# Patient Record
Sex: MALE | Race: BLACK OR AFRICAN AMERICAN | NOT HISPANIC OR LATINO
[De-identification: names, ages, dates, MRNs, and addresses within clinical notes are randomized per-mention and may not be internally consistent; named-entity substitution may affect disease eponyms.]

---

## 2018-06-01 ENCOUNTER — TRANSCRIPTION ENCOUNTER (OUTPATIENT)
Age: 53
End: 2018-06-01

## 2020-04-11 ENCOUNTER — EMERGENCY (EMERGENCY)
Facility: HOSPITAL | Age: 55
LOS: 0 days | Discharge: HOME | End: 2020-04-12
Attending: EMERGENCY MEDICINE | Admitting: EMERGENCY MEDICINE
Payer: SUBSIDIZED

## 2020-04-11 VITALS
WEIGHT: 201.94 LBS | HEART RATE: 78 BPM | HEIGHT: 75 IN | RESPIRATION RATE: 18 BRPM | TEMPERATURE: 98 F | OXYGEN SATURATION: 98 % | DIASTOLIC BLOOD PRESSURE: 97 MMHG | SYSTOLIC BLOOD PRESSURE: 160 MMHG

## 2020-04-11 DIAGNOSIS — Z91.013 ALLERGY TO SEAFOOD: ICD-10-CM

## 2020-04-11 DIAGNOSIS — Z91.040 LATEX ALLERGY STATUS: ICD-10-CM

## 2020-04-11 DIAGNOSIS — R10.9 UNSPECIFIED ABDOMINAL PAIN: ICD-10-CM

## 2020-04-11 DIAGNOSIS — Z87.891 PERSONAL HISTORY OF NICOTINE DEPENDENCE: ICD-10-CM

## 2020-04-11 DIAGNOSIS — R10.13 EPIGASTRIC PAIN: ICD-10-CM

## 2020-04-11 DIAGNOSIS — R10.11 RIGHT UPPER QUADRANT PAIN: ICD-10-CM

## 2020-04-11 LAB
ALBUMIN SERPL ELPH-MCNC: 4.4 G/DL — SIGNIFICANT CHANGE UP (ref 3.5–5.2)
ALP SERPL-CCNC: 94 U/L — SIGNIFICANT CHANGE UP (ref 30–115)
ALT FLD-CCNC: 19 U/L — SIGNIFICANT CHANGE UP (ref 0–41)
ANION GAP SERPL CALC-SCNC: 12 MMOL/L — SIGNIFICANT CHANGE UP (ref 7–14)
APTT BLD: 27.9 SEC — SIGNIFICANT CHANGE UP (ref 27–39.2)
AST SERPL-CCNC: 23 U/L — SIGNIFICANT CHANGE UP (ref 0–41)
BASOPHILS # BLD AUTO: 0.01 K/UL — SIGNIFICANT CHANGE UP (ref 0–0.2)
BASOPHILS NFR BLD AUTO: 0.2 % — SIGNIFICANT CHANGE UP (ref 0–1)
BILIRUB DIRECT SERPL-MCNC: 0.2 MG/DL — SIGNIFICANT CHANGE UP (ref 0–0.2)
BILIRUB INDIRECT FLD-MCNC: 0.9 MG/DL — SIGNIFICANT CHANGE UP (ref 0.2–1.2)
BILIRUB SERPL-MCNC: 1.1 MG/DL — SIGNIFICANT CHANGE UP (ref 0.2–1.2)
BUN SERPL-MCNC: 11 MG/DL — SIGNIFICANT CHANGE UP (ref 10–20)
CALCIUM SERPL-MCNC: 9.3 MG/DL — SIGNIFICANT CHANGE UP (ref 8.5–10.1)
CHLORIDE SERPL-SCNC: 98 MMOL/L — SIGNIFICANT CHANGE UP (ref 98–110)
CO2 SERPL-SCNC: 27 MMOL/L — SIGNIFICANT CHANGE UP (ref 17–32)
CREAT SERPL-MCNC: 1.1 MG/DL — SIGNIFICANT CHANGE UP (ref 0.7–1.5)
EOSINOPHIL # BLD AUTO: 0.19 K/UL — SIGNIFICANT CHANGE UP (ref 0–0.7)
EOSINOPHIL NFR BLD AUTO: 4.3 % — SIGNIFICANT CHANGE UP (ref 0–8)
GLUCOSE SERPL-MCNC: 96 MG/DL — SIGNIFICANT CHANGE UP (ref 70–99)
HCT VFR BLD CALC: 41.6 % — LOW (ref 42–52)
HGB BLD-MCNC: 14.2 G/DL — SIGNIFICANT CHANGE UP (ref 14–18)
IMM GRANULOCYTES NFR BLD AUTO: 0.2 % — SIGNIFICANT CHANGE UP (ref 0.1–0.3)
INR BLD: 1.06 RATIO — SIGNIFICANT CHANGE UP (ref 0.65–1.3)
LACTATE SERPL-SCNC: 1 MMOL/L — SIGNIFICANT CHANGE UP (ref 0.7–2)
LIDOCAIN IGE QN: 27 U/L — SIGNIFICANT CHANGE UP (ref 7–60)
LYMPHOCYTES # BLD AUTO: 2 K/UL — SIGNIFICANT CHANGE UP (ref 1.2–3.4)
LYMPHOCYTES # BLD AUTO: 44.8 % — SIGNIFICANT CHANGE UP (ref 20.5–51.1)
MCHC RBC-ENTMCNC: 29.5 PG — SIGNIFICANT CHANGE UP (ref 27–31)
MCHC RBC-ENTMCNC: 34.1 G/DL — SIGNIFICANT CHANGE UP (ref 32–37)
MCV RBC AUTO: 86.5 FL — SIGNIFICANT CHANGE UP (ref 80–94)
MONOCYTES # BLD AUTO: 0.49 K/UL — SIGNIFICANT CHANGE UP (ref 0.1–0.6)
MONOCYTES NFR BLD AUTO: 11 % — HIGH (ref 1.7–9.3)
NEUTROPHILS # BLD AUTO: 1.76 K/UL — SIGNIFICANT CHANGE UP (ref 1.4–6.5)
NEUTROPHILS NFR BLD AUTO: 39.5 % — LOW (ref 42.2–75.2)
NRBC # BLD: 0 /100 WBCS — SIGNIFICANT CHANGE UP (ref 0–0)
PLATELET # BLD AUTO: 239 K/UL — SIGNIFICANT CHANGE UP (ref 130–400)
POTASSIUM SERPL-MCNC: 4.5 MMOL/L — SIGNIFICANT CHANGE UP (ref 3.5–5)
POTASSIUM SERPL-SCNC: 4.5 MMOL/L — SIGNIFICANT CHANGE UP (ref 3.5–5)
PROT SERPL-MCNC: 7.8 G/DL — SIGNIFICANT CHANGE UP (ref 6–8)
PROTHROM AB SERPL-ACNC: 12.2 SEC — SIGNIFICANT CHANGE UP (ref 9.95–12.87)
RBC # BLD: 4.81 M/UL — SIGNIFICANT CHANGE UP (ref 4.7–6.1)
RBC # FLD: 11.6 % — SIGNIFICANT CHANGE UP (ref 11.5–14.5)
SODIUM SERPL-SCNC: 137 MMOL/L — SIGNIFICANT CHANGE UP (ref 135–146)
WBC # BLD: 4.46 K/UL — LOW (ref 4.8–10.8)
WBC # FLD AUTO: 4.46 K/UL — LOW (ref 4.8–10.8)

## 2020-04-11 PROCEDURE — 74177 CT ABD & PELVIS W/CONTRAST: CPT | Mod: 26

## 2020-04-11 PROCEDURE — 99285 EMERGENCY DEPT VISIT HI MDM: CPT

## 2020-04-11 PROCEDURE — 76705 ECHO EXAM OF ABDOMEN: CPT | Mod: 26

## 2020-04-11 RX ORDER — FAMOTIDINE 10 MG/ML
20 INJECTION INTRAVENOUS ONCE
Refills: 0 | Status: COMPLETED | OUTPATIENT
Start: 2020-04-11 | End: 2020-04-11

## 2020-04-11 RX ORDER — ONDANSETRON 8 MG/1
4 TABLET, FILM COATED ORAL ONCE
Refills: 0 | Status: DISCONTINUED | OUTPATIENT
Start: 2020-04-11 | End: 2020-04-12

## 2020-04-11 RX ADMIN — FAMOTIDINE 20 MILLIGRAM(S): 10 INJECTION INTRAVENOUS at 23:01

## 2020-04-11 NOTE — ED ADULT NURSE NOTE - OBJECTIVE STATEMENT
Patient presents to ED c/o pain under right ribs that started off as itching and then began to spread to upper and lower abdominal quadrants. Patient states that he has been experiencing this pain over the last two weeks. Denies nausea/ vomiting or any changes in bowel pattern.

## 2020-04-11 NOTE — ED PROVIDER NOTE - ATTENDING CONTRIBUTION TO CARE
Patient is presenting to ED c/o RUQ abdominal pain with intermittent radiation to Rt upper back area, denies trauma, denies cp/sob/f/c/n/v/URI symptoms, denies urinary symptoms, denies  symptoms. Denies neurologic symptoms.   vitals noted  lungs: CTA, no crackles  abd: +BS, +RUQ tenderness, ND, soft  Ext: no E/E/C: pulse+  no calf tenderness  Back: no midline vertebral column tenderness, no pain on ROM, no CVA tenderness.   No saddle anesthesia, distally NVI.   A/P: RUQ abd pain  labs, imaging, reevaluation

## 2020-04-11 NOTE — ED PROVIDER NOTE - PROGRESS NOTE DETAILS
TG: patient resting comfortably at this time, reports no active pain, abd soft/nontender throughout. discussed labs, us, and ct results. cxr pending. Will continue to monitor. TG: Patient requesting to be d/c prior to chest x-ray, denies pain at this time, no resp distress. Results discussed with patient, printed copies given. Patient agreeable and verbalizes understanding of plan of care, f/u, and return precautions.

## 2020-04-11 NOTE — ED PROVIDER NOTE - CARE PROVIDER_API CALL
See Holden)  Gastroenterology  [address] [place] [address]  Phone: [phone]  Fax: [phone]  Follow Up Time: 1-3 Days    Pacheco Casey)  Gastroenterology  [address] [place] [address]  Phone: [phone]  Fax: [phone]  Follow Up Time: 1-3 Days

## 2020-04-11 NOTE — ED PROVIDER NOTE - PROVIDER TOKENS
PROVIDER:[TOKEN:[[id number]],FOLLOWUP:[1-3 Days]],PROVIDER:[TOKEN:[[id number]],FOLLOWUP:[1-3 Days]]

## 2020-04-11 NOTE — ED PROVIDER NOTE - NS ED ROS FT
CONSTITUTIONAL: (-) fevers, (-) chills, (-) malaise, (-) decreased appetite  CARDIO: (-) chest pain, (-) palpitations  PULM: (-) cough, (-) shortness of breath  GI: see HPI  : (-) dysuria, (-) hematuria, (-) frequency, (-) flank pain  MSK: (-) back pain, (-) myalgias  SKIN: (-) rashes, (-) pallor, (-) jaundice  NEURO: (-) headache, (-) dizziness, (-) lightheadedness,  (-) weakness    *all other systems negative except as documented above and in the HPI*

## 2020-04-11 NOTE — ED PROVIDER NOTE - CARE PROVIDERS DIRECT ADDRESSES
[email or web address]

## 2020-04-11 NOTE — ED PROVIDER NOTE - DISCHARGE REVIEW MATERIAL PRESENTED
. Remain free from infection and pain is controlled Your ICD can sense and treat certain abnormal heart beats  If your ICD gives you a shock (you will probably feel it), let your doctor know so he/she can check the device & make changes in the device as needed or change your medication.  Check your device as directed on a regular basis  No driving until your are seen at your follow up appointment   Avoid electric or magnetic equipment   If you are not able to use metal detectors at airports, ask for hand security search  Tell any doctors or nurses that you have an ICD  You cannot have an MRI  You may want to get a medical alert bracelet indicating that you have an ICD  Follow directions your doctor gave you regarding arm movement & exercises, lifting  Always carry your ICD card in your wallet.  It is important to know what brand of ICD you have in case of emergency   ***Your follow up appointment is on 9/20 at 9am in the [facility] [phone] Weigh yourself daily.  If you gain 3lbs in 3 days, or 5lbs in a week call your Health Care Provider.  Do not eat or drink foods containing more than 2000mg of salt (sodium) in your diet every day.  Call your Health Care Provider if you have any swelling or increased swelling in your feet, ankles, and/or stomach.  Take all of your medication as directed.  If you become dizzy call your Health Care Provider. Make an appointment to see Dr Suárez Make an appointment to see your Nephrologist within 1 week of discharge to monitor your Kidney function

## 2020-04-11 NOTE — ED PROVIDER NOTE - OBJECTIVE STATEMENT
55 year old male w hx of left inguinal hernia repair presents to the ED for evaluation of gradual onset, intermittent RUQ pain with occasional radiation into his epigastrium and back. States pain is worse with meals. Denies fevers/chills, chest pain, shortness of breath, cough, flank pain, nausea, vomiting, diarrhea, black/bloody stools, irritative voiding symptoms.

## 2020-04-11 NOTE — ED PROVIDER NOTE - PHYSICAL EXAMINATION
VITALS:  I have reviewed the initial vital signs.  GENERAL: Well-developed, well-nourished, in no acute distress. Nontoxic.  HEENT: Sclera clear. No conjunctival pallor. EOMI, PERRLA. Mucous membranes moist.  CARDIO: RRR, nl S1 and S2. No murmurs, rubs, or gallops.  PULM: Normal effort. CTA b/l without wheezes, rales, or rhonchi.  GI: Normal bowel sounds. Abdomen soft and non-distended. +mild RUQ and epigastric ttp. Otherwise nontender throughout without rebound or guarding. No pulsatile masses.  : No CVA tenderness b/l.  SKIN: Warm, dry. No rash. No jaundice.   NEURO: A&Ox3. Speech clear. No gross motor/sensory deficits. no

## 2020-04-20 ENCOUNTER — APPOINTMENT (OUTPATIENT)
Dept: GASTROENTEROLOGY | Facility: CLINIC | Age: 55
End: 2020-04-20

## 2020-12-30 ENCOUNTER — EMERGENCY (EMERGENCY)
Facility: HOSPITAL | Age: 55
LOS: 0 days | Discharge: LEFT BEFORE DISCHARGE | End: 2020-12-30
Attending: EMERGENCY MEDICINE | Admitting: EMERGENCY MEDICINE
Payer: MEDICAID

## 2020-12-30 VITALS
TEMPERATURE: 98 F | SYSTOLIC BLOOD PRESSURE: 134 MMHG | OXYGEN SATURATION: 99 % | RESPIRATION RATE: 18 BRPM | HEIGHT: 75 IN | DIASTOLIC BLOOD PRESSURE: 79 MMHG | HEART RATE: 75 BPM

## 2020-12-30 DIAGNOSIS — Z20.828 CONTACT WITH AND (SUSPECTED) EXPOSURE TO OTHER VIRAL COMMUNICABLE DISEASES: ICD-10-CM

## 2020-12-30 DIAGNOSIS — R10.10 UPPER ABDOMINAL PAIN, UNSPECIFIED: ICD-10-CM

## 2020-12-30 DIAGNOSIS — R11.0 NAUSEA: ICD-10-CM

## 2020-12-30 DIAGNOSIS — Z88.8 ALLERGY STATUS TO OTHER DRUGS, MEDICAMENTS AND BIOLOGICAL SUBSTANCES: ICD-10-CM

## 2020-12-30 DIAGNOSIS — Z91.013 ALLERGY TO SEAFOOD: ICD-10-CM

## 2020-12-30 LAB
ALBUMIN SERPL ELPH-MCNC: 4 G/DL — SIGNIFICANT CHANGE UP (ref 3.5–5.2)
ALP SERPL-CCNC: 85 U/L — SIGNIFICANT CHANGE UP (ref 30–115)
ALT FLD-CCNC: 15 U/L — SIGNIFICANT CHANGE UP (ref 0–41)
ANION GAP SERPL CALC-SCNC: 11 MMOL/L — SIGNIFICANT CHANGE UP (ref 7–14)
AST SERPL-CCNC: 24 U/L — SIGNIFICANT CHANGE UP (ref 0–41)
BASOPHILS # BLD AUTO: 0.02 K/UL — SIGNIFICANT CHANGE UP (ref 0–0.2)
BASOPHILS NFR BLD AUTO: 0.5 % — SIGNIFICANT CHANGE UP (ref 0–1)
BILIRUB DIRECT SERPL-MCNC: 0.2 MG/DL — SIGNIFICANT CHANGE UP (ref 0–0.2)
BILIRUB INDIRECT FLD-MCNC: 0.6 MG/DL — SIGNIFICANT CHANGE UP (ref 0.2–1.2)
BILIRUB SERPL-MCNC: 0.8 MG/DL — SIGNIFICANT CHANGE UP (ref 0.2–1.2)
BUN SERPL-MCNC: 15 MG/DL — SIGNIFICANT CHANGE UP (ref 10–20)
CALCIUM SERPL-MCNC: 8.2 MG/DL — LOW (ref 8.5–10.1)
CHLORIDE SERPL-SCNC: 104 MMOL/L — SIGNIFICANT CHANGE UP (ref 98–110)
CO2 SERPL-SCNC: 24 MMOL/L — SIGNIFICANT CHANGE UP (ref 17–32)
CREAT SERPL-MCNC: 1.3 MG/DL — SIGNIFICANT CHANGE UP (ref 0.7–1.5)
EOSINOPHIL # BLD AUTO: 0.03 K/UL — SIGNIFICANT CHANGE UP (ref 0–0.7)
EOSINOPHIL NFR BLD AUTO: 0.7 % — SIGNIFICANT CHANGE UP (ref 0–8)
GLUCOSE SERPL-MCNC: 94 MG/DL — SIGNIFICANT CHANGE UP (ref 70–99)
HCT VFR BLD CALC: 39.4 % — LOW (ref 42–52)
HGB BLD-MCNC: 13.2 G/DL — LOW (ref 14–18)
IMM GRANULOCYTES NFR BLD AUTO: 0.2 % — SIGNIFICANT CHANGE UP (ref 0.1–0.3)
LIDOCAIN IGE QN: 34 U/L — SIGNIFICANT CHANGE UP (ref 7–60)
LYMPHOCYTES # BLD AUTO: 1.9 K/UL — SIGNIFICANT CHANGE UP (ref 1.2–3.4)
LYMPHOCYTES # BLD AUTO: 44.5 % — SIGNIFICANT CHANGE UP (ref 20.5–51.1)
MCHC RBC-ENTMCNC: 30.3 PG — SIGNIFICANT CHANGE UP (ref 27–31)
MCHC RBC-ENTMCNC: 33.5 G/DL — SIGNIFICANT CHANGE UP (ref 32–37)
MCV RBC AUTO: 90.6 FL — SIGNIFICANT CHANGE UP (ref 80–94)
MONOCYTES # BLD AUTO: 0.51 K/UL — SIGNIFICANT CHANGE UP (ref 0.1–0.6)
MONOCYTES NFR BLD AUTO: 11.9 % — HIGH (ref 1.7–9.3)
NEUTROPHILS # BLD AUTO: 1.8 K/UL — SIGNIFICANT CHANGE UP (ref 1.4–6.5)
NEUTROPHILS NFR BLD AUTO: 42.2 % — SIGNIFICANT CHANGE UP (ref 42.2–75.2)
NRBC # BLD: 0 /100 WBCS — SIGNIFICANT CHANGE UP (ref 0–0)
PLATELET # BLD AUTO: 204 K/UL — SIGNIFICANT CHANGE UP (ref 130–400)
POTASSIUM SERPL-MCNC: 4.1 MMOL/L — SIGNIFICANT CHANGE UP (ref 3.5–5)
POTASSIUM SERPL-SCNC: 4.1 MMOL/L — SIGNIFICANT CHANGE UP (ref 3.5–5)
PROT SERPL-MCNC: 7.1 G/DL — SIGNIFICANT CHANGE UP (ref 6–8)
RBC # BLD: 4.35 M/UL — LOW (ref 4.7–6.1)
RBC # FLD: 11.4 % — LOW (ref 11.5–14.5)
SODIUM SERPL-SCNC: 139 MMOL/L — SIGNIFICANT CHANGE UP (ref 135–146)
WBC # BLD: 4.27 K/UL — LOW (ref 4.8–10.8)
WBC # FLD AUTO: 4.27 K/UL — LOW (ref 4.8–10.8)

## 2020-12-30 PROCEDURE — 71045 X-RAY EXAM CHEST 1 VIEW: CPT | Mod: 26

## 2020-12-30 PROCEDURE — 99285 EMERGENCY DEPT VISIT HI MDM: CPT

## 2020-12-30 RX ORDER — SODIUM CHLORIDE 9 MG/ML
1000 INJECTION INTRAMUSCULAR; INTRAVENOUS; SUBCUTANEOUS ONCE
Refills: 0 | Status: COMPLETED | OUTPATIENT
Start: 2020-12-30 | End: 2020-12-30

## 2020-12-30 RX ORDER — ONDANSETRON 8 MG/1
4 TABLET, FILM COATED ORAL ONCE
Refills: 0 | Status: COMPLETED | OUTPATIENT
Start: 2020-12-30 | End: 2020-12-30

## 2020-12-30 RX ORDER — FAMOTIDINE 10 MG/ML
20 INJECTION INTRAVENOUS ONCE
Refills: 0 | Status: COMPLETED | OUTPATIENT
Start: 2020-12-30 | End: 2020-12-30

## 2020-12-30 RX ADMIN — ONDANSETRON 4 MILLIGRAM(S): 8 TABLET, FILM COATED ORAL at 17:17

## 2020-12-30 RX ADMIN — SODIUM CHLORIDE 1000 MILLILITER(S): 9 INJECTION INTRAMUSCULAR; INTRAVENOUS; SUBCUTANEOUS at 17:17

## 2020-12-30 RX ADMIN — FAMOTIDINE 20 MILLIGRAM(S): 10 INJECTION INTRAVENOUS at 17:17

## 2020-12-30 NOTE — ED PROVIDER NOTE - NSFOLLOWUPINSTRUCTIONS_ED_ALL_ED_FT
Abdominal Pain    Many things can cause abdominal pain. Many times, abdominal pain is not caused by a disease and will improve without treatment. Your health care provider will do a physical exam to determine if there is a dangerous cause of your pain; blood tests and imaging may help determine the cause of your pain. However, in many cases, no cause may be found and you may need further testing as an outpatient. Monitor your abdominal pain for any changes.     SEEK IMMEDIATE MEDICAL CARE IF YOU HAVE ANY OF THE FOLLOWING SYMPTOMS: worsening abdominal pain, uncontrollable vomiting, profuse diarrhea, inability to have bowel movements or pass gas, black or bloody stools, fever accompanying chest pain or back pain, or fainting. These symptoms may represent a serious problem that is an emergency. Do not wait to see if the symptoms will go away. Get medical help right away. Call 911 and do not drive yourself to the hospital.    Nausea / Vomiting    Nausea is the feeling that you have to vomit. As nausea gets worse, it can lead to vomiting. Vomiting puts you at an increased risk for dehydration. Older adults and people with other diseases or a weak immune system are at higher risk for dehydration. Drink clear fluids in small but frequent amounts as tolerated. Eat bland, easy-to-digest foods in small amounts as tolerated.    SEEK IMMEDIATE MEDICAL CARE IF YOU HAVE ANY OF THE FOLLOWING SYMPTOMS: fever, inability to keep sufficient fluids down, black or bloody vomitus, black or bloody stools, lightheadedness/dizziness, chest pain, severe headache, rash, shortness of breath, cold or clammy skin, confusion, pain with urination, or any signs of dehydration.    Please follow up with gastroenterologist in 1 to 2 weeks.

## 2020-12-30 NOTE — ED PROVIDER NOTE - NS ED ROS FT
CONSTITUTIONAL: (-) fevers, (-) chills, (-) malaise, (-) decreased appetite  ENT: (-) congestion, (-) rhinorrhea, (-) sore throat  CARDIO: (-) chest pain, (-) palpitations  PULM: (-) cough, (-) sputum, (-) chest tightness, (-) shortness of breath  GI: see HPI  : (-) dysuria, (-) hematuria, (-) frequency, (-) urgency, (-) flank pain  ENDO: (-) polyuria, (-) polydipsia, (-) polyphagia  HEME: (-) easy bruising, (-) easy bleeding  MSK: (-) back pain, (-) myalgias  SKIN: (-) rashes, (-) pallor, (-) jaundice  NEURO: (-) headache, (-) dizziness, (-) lightheadedness, (-) weakness    *all other systems negative except as documented above and in the HPI*

## 2020-12-30 NOTE — ED PROVIDER NOTE - REFUSAL OF SERVICE, MDM
Patient to sign out against medical advice, understands the risks and able to verbally tell me the risks involved by not getting a CT scan to find out the cause of his abdominal pain, if it is caused by any serious disease, it can cause severe morbidity and can cause death, patient understands and still wants to sign out against medical advice, patient signed the form, will d/c with GI f/u

## 2020-12-30 NOTE — ED PROVIDER NOTE - OBJECTIVE STATEMENT
55 year old male w hx of left inguinal hernia repair 20 years ago presents to the ED for evaluation of intermittent, cramping upper abdominal pain and bloating x 2 days. Pain is worse with meals, reports associated nausea. Not improved with tylenol or motrin. Has a history of similar symptoms in April of this year, was seen in this ED and had negative ct/us/labs, given f/u with GI but never followed up due to resolution in symptoms. Denies fevers/chills, chest pain, sob, v/d, constipation, obstipation, back/flank pain, irritative voiding symptoms, black/bloody stools, recent travel/sick contacts/antibiotics.

## 2020-12-30 NOTE — ED PROVIDER NOTE - ATTENDING CONTRIBUTION TO CARE
54 y/o male with no sig pmhx in ER with c/o abdominal pain.  Pain to middle of abdomen radiating across both sides.  waxes and wanes.  +N.  no v/d.  no urinary complaints.  no f/c.  Pt was in ER for same sx's in 4/2020, had neg w/u was supposed to f/u with GI, but has not as yet.  Pt states the pain has been there since April, but now feels worse.  No cp/sob.  no ha/dizziness/loc.  no back pain.  no le pain/swelling.  no h/o abdominal surgery.  PE - nad, nc/at, eomi, perrl, op - clear, mmm, neck supple, cta b/l, no w/r/r, rrr, abd- soft, mild mid abdominal tenderness, no guarding/rebound, nabs, from x 4, A&O x 3, no focal neuro deficits.  - check labs, ct abd, re-eval.

## 2020-12-30 NOTE — ED PROVIDER NOTE - PHYSICAL EXAMINATION
VITALS:  I have reviewed the initial vital signs.  GENERAL: Well-developed, well-nourished, in no acute distress. Nontoxic.  HEENT: Sclera clear. No conjunctival pallor. EOMI, PERRLA. MMM.   NECK: supple w FROM.   CARDIO: RRR, nl S1 and S2. No murmurs, rubs, or gallops.   PULM: Normal effort. CTA b/l without wheezes, rales, or rhonchi.  MSK: Normal, steady gait.   GI: Normal bowel sounds. Abdomen soft and non-distended. Nontender in all four quadrants without rebound or guarding.   : No CVA tenderness b/l.  SKIN: Warm, dry. No pallor. No rash. No jaundice.   NEURO: A&Ox3. Speech clear. No focal deficits.  PSYCH: Calm and cooperative.

## 2020-12-30 NOTE — ED PROVIDER NOTE - CLINICAL SUMMARY MEDICAL DECISION MAKING FREE TEXT BOX
Pt in ER with c/o worsening abd pain, + N.  labs and cxr ok.  pt to get ct abd and RUQ sono, however pt feeling better in ER and does not want to wait for radiology studies.  pt told that w/u incomplete and unclear etiology for his symptoms, told that leaving could result in death or permanent disability.  pt understands, but still wants to go.  pt told to f/u with GI and that he can return to ER at any time to continue his evaluation.  pt to s/o ama.

## 2020-12-30 NOTE — ED PROVIDER NOTE - PROGRESS NOTE DETAILS
TG: patient endorsed to Dr. Watters to f/u ct, disposition. MQ: Patient to sign out against medical advice, understands the risks and able to verbally tell me the risks involved by not getting a CT scan to find out the cause of his abdominal pain, if it is caused by any serious disease, it can cause severe morbidity and can cause death, patient understands and still wants to sign out against medical advice, patient signed the form, will d/c with GI f/u

## 2020-12-30 NOTE — ED ADULT NURSE NOTE - OBJECTIVE STATEMENT
Patient c/o B/L upper abdominal pain x 2 days, patient states pain is sharp. Denies nausea/vomiting/fever/chills/SOB/CP. Patient has +neck stiffness, +blurred vision and lightheadedness. On assessment abdomen soft tender nondistended.

## 2020-12-30 NOTE — ED PROVIDER NOTE - CARE PROVIDER_API CALL
See Holden  GASTROENTEROLOGY  [address] [place] [address]  Phone: [phone]  Fax: [phone]  Follow Up Time: 7-10 Days

## 2021-01-07 ENCOUNTER — APPOINTMENT (OUTPATIENT)
Dept: GASTROENTEROLOGY | Facility: CLINIC | Age: 56
End: 2021-01-07
Payer: MEDICAID

## 2021-01-07 DIAGNOSIS — Z12.11 ENCOUNTER FOR SCREENING FOR MALIGNANT NEOPLASM OF COLON: ICD-10-CM

## 2021-01-07 DIAGNOSIS — Z80.1 FAMILY HISTORY OF MALIGNANT NEOPLASM OF TRACHEA, BRONCHUS AND LUNG: ICD-10-CM

## 2021-01-07 DIAGNOSIS — Z78.9 OTHER SPECIFIED HEALTH STATUS: ICD-10-CM

## 2021-01-07 DIAGNOSIS — R10.10 UPPER ABDOMINAL PAIN, UNSPECIFIED: ICD-10-CM

## 2021-01-07 PROCEDURE — 99204 OFFICE O/P NEW MOD 45 MIN: CPT | Mod: 95

## 2021-01-07 NOTE — HISTORY OF PRESENT ILLNESS
[Home] : at home, [unfilled] , at the time of the visit. [Medical Office: ([facility])___] : at the medical office located in  [Verbal consent obtained from patient] : the patient, [unfilled] [FreeTextEntry4] : Ana Blue [de-identified] : This is a 55 year old male  who presents for evaluation of abdominal pain. He notes luq and ruq pain, worse with meals. The patient denies abdominal cramping, or black or bloody stool. The patient denies unintentional weight loss.   He takes excedrin several times per week.

## 2021-01-25 ENCOUNTER — OUTPATIENT (OUTPATIENT)
Dept: OUTPATIENT SERVICES | Facility: HOSPITAL | Age: 56
LOS: 1 days | Discharge: HOME | End: 2021-01-25

## 2021-01-25 ENCOUNTER — LABORATORY RESULT (OUTPATIENT)
Age: 56
End: 2021-01-25

## 2021-01-25 DIAGNOSIS — Z11.59 ENCOUNTER FOR SCREENING FOR OTHER VIRAL DISEASES: ICD-10-CM

## 2021-01-28 ENCOUNTER — TRANSCRIPTION ENCOUNTER (OUTPATIENT)
Age: 56
End: 2021-01-28

## 2021-01-28 ENCOUNTER — OUTPATIENT (OUTPATIENT)
Dept: OUTPATIENT SERVICES | Facility: HOSPITAL | Age: 56
LOS: 1 days | Discharge: HOME | End: 2021-01-28
Payer: MEDICAID

## 2021-01-28 ENCOUNTER — RESULT REVIEW (OUTPATIENT)
Age: 56
End: 2021-01-28

## 2021-01-28 VITALS
SYSTOLIC BLOOD PRESSURE: 128 MMHG | DIASTOLIC BLOOD PRESSURE: 81 MMHG | RESPIRATION RATE: 18 BRPM | HEART RATE: 67 BPM | OXYGEN SATURATION: 99 %

## 2021-01-28 VITALS
HEART RATE: 64 BPM | RESPIRATION RATE: 18 BRPM | WEIGHT: 195.11 LBS | SYSTOLIC BLOOD PRESSURE: 152 MMHG | HEIGHT: 75 IN | TEMPERATURE: 98 F | DIASTOLIC BLOOD PRESSURE: 89 MMHG

## 2021-01-28 DIAGNOSIS — Z87.19 PERSONAL HISTORY OF OTHER DISEASES OF THE DIGESTIVE SYSTEM: Chronic | ICD-10-CM

## 2021-01-28 PROCEDURE — 43239 EGD BIOPSY SINGLE/MULTIPLE: CPT | Mod: XS

## 2021-01-28 PROCEDURE — G0121 COLON CA SCRN NOT HI RSK IND: CPT

## 2021-01-28 PROCEDURE — 88312 SPECIAL STAINS GROUP 1: CPT | Mod: 26

## 2021-01-28 PROCEDURE — 88305 TISSUE EXAM BY PATHOLOGIST: CPT | Mod: 26

## 2021-01-28 NOTE — CHART NOTE - NSCHARTNOTEFT_GEN_A_CORE
PACU ANESTHESIA ADMISSION NOTE      Procedure:   Post op diagnosis:      ____  Intubated  TV:______       Rate: ______      FiO2: ______    __x__  Patent Airway    __x__  Full return of protective reflexes    __x__  Full recovery from anesthesia / back to baseline     Vitals:   See Anesthesia record  T- 98.5 P- 79 R- 16 B/P- 106/63 SpO2- 97% on RA    Mental Status:  __x__ Awake   _____ Alert   ___x__ Drowsy   _____ Sedated    Nausea/Vomiting:  __x__ NO  ______Yes,   See Post - Op Orders          Pain Scale (0-10):  __0___    Treatment: ____ None    ____ See Post - Op/PCA Orders    Post - Operative Fluids:   ____ Oral   __x__ See Post - Op Orders    Plan: Discharge:   __x__Home       _____Floor     _____Critical Care    _____  Other:_________________    Comments: No anesthesia complications/issues noted. Discharge to home when criteria met.

## 2021-01-28 NOTE — H&P PST ADULT - HISTORY OF PRESENT ILLNESS
54 yo male with abdominal pain  and need for screening colonoscopy for upper endoscopy and colonoscopy

## 2021-01-28 NOTE — ASU DISCHARGE PLAN (ADULT/PEDIATRIC) - CARE PROVIDER_API CALL
Chao Jones)  Gastroenterology; Internal Medicine  [address] [place] [address]  Phone: [phone]  Fax: [phone]  Follow Up Time: 1 month

## 2021-01-28 NOTE — H&P PST ADULT - ATTENDING COMMENTS
56 yo male with abdominal pain  and need for screening colonoscopy for upper endoscopy and colonoscopy

## 2021-01-30 PROBLEM — Z78.9 OTHER SPECIFIED HEALTH STATUS: Chronic | Status: ACTIVE | Noted: 2021-01-28

## 2021-02-01 ENCOUNTER — APPOINTMENT (OUTPATIENT)
Dept: GASTROENTEROLOGY | Facility: CLINIC | Age: 56
End: 2021-02-01

## 2021-02-01 LAB — SURGICAL PATHOLOGY STUDY: SIGNIFICANT CHANGE UP

## 2021-02-01 RX ORDER — PANTOPRAZOLE 40 MG/1
40 TABLET, DELAYED RELEASE ORAL
Qty: 30 | Refills: 3 | Status: DISCONTINUED | COMMUNITY
Start: 2021-01-28 | End: 2021-02-01

## 2021-02-01 RX ORDER — SODIUM SULFATE, POTASSIUM SULFATE, MAGNESIUM SULFATE 17.5; 3.13; 1.6 G/ML; G/ML; G/ML
17.5-3.13-1.6 SOLUTION, CONCENTRATE ORAL
Qty: 2 | Refills: 0 | Status: DISCONTINUED | COMMUNITY
Start: 2021-01-07 | End: 2021-02-01

## 2021-02-04 ENCOUNTER — APPOINTMENT (OUTPATIENT)
Dept: GASTROENTEROLOGY | Facility: CLINIC | Age: 56
End: 2021-02-04
Payer: MEDICAID

## 2021-02-04 DIAGNOSIS — K25.9 GASTRIC ULCER, UNSPECIFIED AS ACUTE OR CHRONIC, W/OUT HEMORRHAGE OR PERFORATION: ICD-10-CM

## 2021-02-04 PROCEDURE — 99214 OFFICE O/P EST MOD 30 MIN: CPT | Mod: 95

## 2021-02-04 RX ORDER — PANTOPRAZOLE 40 MG/1
40 TABLET, DELAYED RELEASE ORAL DAILY
Qty: 30 | Refills: 2 | Status: DISCONTINUED | COMMUNITY
Start: 2021-01-07 | End: 2021-02-04

## 2021-02-04 RX ORDER — FAMOTIDINE 20 MG/1
20 TABLET ORAL
Qty: 60 | Refills: 0 | Status: DISCONTINUED | COMMUNITY
Start: 2021-01-12 | End: 2021-02-04

## 2021-02-04 NOTE — HISTORY OF PRESENT ILLNESS
[Home] : at home, [unfilled] , at the time of the visit. [Medical Office: ([facility])___] : at the medical office located in  [Verbal consent obtained from patient] : the patient, [unfilled] [FreeTextEntry4] : Ana Blue [de-identified] : This is a 55 year old mal e who presents for evaluation of gastric ulcer disease. He had an egd that  showed 1.5 cm nonhp ulcer in the antrum.  He was given protonix and pepcid but did not take them due to headaches. The patient denies nausea, vomiting , dysphagia, odynophagia, , or melena. The patient denies abdominal cramping, or black or bloody stool.

## 2021-02-05 DIAGNOSIS — K29.50 UNSPECIFIED CHRONIC GASTRITIS WITHOUT BLEEDING: ICD-10-CM

## 2021-02-05 DIAGNOSIS — K64.8 OTHER HEMORRHOIDS: ICD-10-CM

## 2021-02-05 DIAGNOSIS — Z87.891 PERSONAL HISTORY OF NICOTINE DEPENDENCE: ICD-10-CM

## 2021-02-05 DIAGNOSIS — Z91.041 RADIOGRAPHIC DYE ALLERGY STATUS: ICD-10-CM

## 2021-02-05 DIAGNOSIS — Z12.11 ENCOUNTER FOR SCREENING FOR MALIGNANT NEOPLASM OF COLON: ICD-10-CM

## 2021-02-05 DIAGNOSIS — R10.9 UNSPECIFIED ABDOMINAL PAIN: ICD-10-CM

## 2021-02-05 DIAGNOSIS — Z91.013 ALLERGY TO SEAFOOD: ICD-10-CM

## 2021-02-08 ENCOUNTER — RX RENEWAL (OUTPATIENT)
Age: 56
End: 2021-02-08

## 2021-04-05 ENCOUNTER — OUTPATIENT (OUTPATIENT)
Dept: OUTPATIENT SERVICES | Facility: HOSPITAL | Age: 56
LOS: 1 days | Discharge: HOME | End: 2021-04-05

## 2021-04-05 ENCOUNTER — LABORATORY RESULT (OUTPATIENT)
Age: 56
End: 2021-04-05

## 2021-04-05 DIAGNOSIS — Z87.19 PERSONAL HISTORY OF OTHER DISEASES OF THE DIGESTIVE SYSTEM: Chronic | ICD-10-CM

## 2021-04-05 DIAGNOSIS — Z11.59 ENCOUNTER FOR SCREENING FOR OTHER VIRAL DISEASES: ICD-10-CM

## 2021-04-26 ENCOUNTER — LABORATORY RESULT (OUTPATIENT)
Age: 56
End: 2021-04-26

## 2021-04-26 ENCOUNTER — OUTPATIENT (OUTPATIENT)
Dept: OUTPATIENT SERVICES | Facility: HOSPITAL | Age: 56
LOS: 1 days | Discharge: HOME | End: 2021-04-26

## 2021-04-26 DIAGNOSIS — Z87.19 PERSONAL HISTORY OF OTHER DISEASES OF THE DIGESTIVE SYSTEM: Chronic | ICD-10-CM

## 2021-04-26 DIAGNOSIS — Z11.59 ENCOUNTER FOR SCREENING FOR OTHER VIRAL DISEASES: ICD-10-CM

## 2021-04-29 ENCOUNTER — OUTPATIENT (OUTPATIENT)
Dept: OUTPATIENT SERVICES | Facility: HOSPITAL | Age: 56
LOS: 1 days | Discharge: HOME | End: 2021-04-29
Payer: MEDICAID

## 2021-04-29 ENCOUNTER — RESULT REVIEW (OUTPATIENT)
Age: 56
End: 2021-04-29

## 2021-04-29 ENCOUNTER — TRANSCRIPTION ENCOUNTER (OUTPATIENT)
Age: 56
End: 2021-04-29

## 2021-04-29 VITALS
TEMPERATURE: 98 F | RESPIRATION RATE: 18 BRPM | DIASTOLIC BLOOD PRESSURE: 91 MMHG | HEART RATE: 70 BPM | HEIGHT: 74 IN | SYSTOLIC BLOOD PRESSURE: 121 MMHG | WEIGHT: 195.11 LBS

## 2021-04-29 VITALS
HEART RATE: 62 BPM | DIASTOLIC BLOOD PRESSURE: 92 MMHG | SYSTOLIC BLOOD PRESSURE: 134 MMHG | TEMPERATURE: 98 F | RESPIRATION RATE: 15 BRPM | OXYGEN SATURATION: 94 %

## 2021-04-29 DIAGNOSIS — Z87.19 PERSONAL HISTORY OF OTHER DISEASES OF THE DIGESTIVE SYSTEM: Chronic | ICD-10-CM

## 2021-04-29 PROCEDURE — 88341 IMHCHEM/IMCYTCHM EA ADD ANTB: CPT | Mod: 26

## 2021-04-29 PROCEDURE — 88313 SPECIAL STAINS GROUP 2: CPT | Mod: 26

## 2021-04-29 PROCEDURE — 88312 SPECIAL STAINS GROUP 1: CPT | Mod: 26

## 2021-04-29 PROCEDURE — 88342 IMHCHEM/IMCYTCHM 1ST ANTB: CPT | Mod: 26

## 2021-04-29 PROCEDURE — 88305 TISSUE EXAM BY PATHOLOGIST: CPT | Mod: 26

## 2021-04-29 PROCEDURE — 43239 EGD BIOPSY SINGLE/MULTIPLE: CPT

## 2021-04-29 NOTE — ASU DISCHARGE PLAN (ADULT/PEDIATRIC) - CARE PROVIDER_API CALL
Chao Jones)  Gastroenterology; Internal Medicine  [address] [place] [address]  Phone: [phone]  Fax: [phone]  Follow Up Time: 1 month

## 2021-04-29 NOTE — ASU DISCHARGE PLAN (ADULT/PEDIATRIC) - C. MAKE IMPORTANT PERSONAL OR BUSINESS DECISIONS
spoke with patient who called in with c/o diarrhea a few episodes this morning and is due for chemo today. patient states her diarrhea is not bloody, denies abdominal pain, denies fever and denies any change in sinus drainage from clear mucous. patient has not taken any imodium this morning. advised to take imodium and call in 30 minutesd and let us know if it has not stopped the diarrhea. spoke with MD and ok to porceed with chemo today, and patient to leave stool sample at lab before chemo. patient and JACQUIE Pires informed of MD orders and will go ahead with treatment as ordered. patient to call with a condition report prior to treatment.      Electronically signed by:Guadalupe Meeks R.N.  Sep 27 2017 10:56AM CST    
Statement Selected

## 2021-05-06 LAB — SURGICAL PATHOLOGY STUDY: SIGNIFICANT CHANGE UP

## 2021-05-07 DIAGNOSIS — K29.50 UNSPECIFIED CHRONIC GASTRITIS WITHOUT BLEEDING: ICD-10-CM

## 2021-05-07 DIAGNOSIS — K25.9 GASTRIC ULCER, UNSPECIFIED AS ACUTE OR CHRONIC, WITHOUT HEMORRHAGE OR PERFORATION: ICD-10-CM

## 2021-05-07 DIAGNOSIS — Z91.013 ALLERGY TO SEAFOOD: ICD-10-CM

## 2021-05-07 DIAGNOSIS — K22.2 ESOPHAGEAL OBSTRUCTION: ICD-10-CM

## 2021-05-07 DIAGNOSIS — Z91.041 RADIOGRAPHIC DYE ALLERGY STATUS: ICD-10-CM

## 2021-05-07 DIAGNOSIS — K20.0 EOSINOPHILIC ESOPHAGITIS: ICD-10-CM

## 2021-06-07 NOTE — PRE-ANESTHESIA EVALUATION ADULT - HEART RATE (BEATS/MIN)
Called pt to discuss mammogram results and the need for addtl. Views and u/s on the left side. [facility].   70

## 2021-10-26 ENCOUNTER — OUTPATIENT (OUTPATIENT)
Dept: OUTPATIENT SERVICES | Facility: HOSPITAL | Age: 56
LOS: 1 days | Discharge: HOME | End: 2021-10-26

## 2021-10-26 DIAGNOSIS — Z87.19 PERSONAL HISTORY OF OTHER DISEASES OF THE DIGESTIVE SYSTEM: Chronic | ICD-10-CM

## 2021-11-08 DIAGNOSIS — K02.63 DENTAL CARIES ON SMOOTH SURFACE PENETRATING INTO PULP: ICD-10-CM

## 2022-02-22 NOTE — PRE-ANESTHESIA EVALUATION ADULT - NSANTHAPLANRD_GEN_ALL_CORE
From: Mariel Badillo  To: Karen Clayton  Sent: 2022 10:33 AM CST  Subject: Lab testing     The lab orders that Dr Clayton placed for me appear to be . I have a follow up exam on  with Dr Clayton. Could these please be requested again.     As I discussed previously with Dr Clayton my insurance does not pay for routine lab tests so they need to be coded as required due to medications ( for high blood pressure)I take or health concerns such as obesity ( unfortunately of which I am). When placing the order can you please verify that they are not routine.   Thanks  Mariel Badillo   monitored anesthesia care (MAC)

## 2022-09-27 ENCOUNTER — EMERGENCY (EMERGENCY)
Facility: HOSPITAL | Age: 57
LOS: 0 days | Discharge: HOME | End: 2022-09-27
Attending: EMERGENCY MEDICINE | Admitting: EMERGENCY MEDICINE

## 2022-09-27 VITALS
WEIGHT: 195.11 LBS | OXYGEN SATURATION: 97 % | HEART RATE: 70 BPM | TEMPERATURE: 98 F | HEIGHT: 74 IN | DIASTOLIC BLOOD PRESSURE: 83 MMHG | RESPIRATION RATE: 20 BRPM | SYSTOLIC BLOOD PRESSURE: 148 MMHG

## 2022-09-27 DIAGNOSIS — R20.2 PARESTHESIA OF SKIN: ICD-10-CM

## 2022-09-27 DIAGNOSIS — R42 DIZZINESS AND GIDDINESS: ICD-10-CM

## 2022-09-27 DIAGNOSIS — R07.89 OTHER CHEST PAIN: ICD-10-CM

## 2022-09-27 DIAGNOSIS — Z88.2 ALLERGY STATUS TO SULFONAMIDES: ICD-10-CM

## 2022-09-27 DIAGNOSIS — Z87.19 PERSONAL HISTORY OF OTHER DISEASES OF THE DIGESTIVE SYSTEM: Chronic | ICD-10-CM

## 2022-09-27 DIAGNOSIS — R51.9 HEADACHE, UNSPECIFIED: ICD-10-CM

## 2022-09-27 DIAGNOSIS — Z91.013 ALLERGY TO SEAFOOD: ICD-10-CM

## 2022-09-27 DIAGNOSIS — Z87.891 PERSONAL HISTORY OF NICOTINE DEPENDENCE: ICD-10-CM

## 2022-09-27 LAB
ALBUMIN SERPL ELPH-MCNC: 4.5 G/DL — SIGNIFICANT CHANGE UP (ref 3.5–5.2)
ALP SERPL-CCNC: 82 U/L — SIGNIFICANT CHANGE UP (ref 30–115)
ALT FLD-CCNC: 21 U/L — SIGNIFICANT CHANGE UP (ref 0–41)
ANION GAP SERPL CALC-SCNC: 9 MMOL/L — SIGNIFICANT CHANGE UP (ref 7–14)
AST SERPL-CCNC: 22 U/L — SIGNIFICANT CHANGE UP (ref 0–41)
BASOPHILS # BLD AUTO: 0.03 K/UL — SIGNIFICANT CHANGE UP (ref 0–0.2)
BASOPHILS NFR BLD AUTO: 0.6 % — SIGNIFICANT CHANGE UP (ref 0–1)
BILIRUB SERPL-MCNC: 1.8 MG/DL — HIGH (ref 0.2–1.2)
BUN SERPL-MCNC: 14 MG/DL — SIGNIFICANT CHANGE UP (ref 10–20)
CALCIUM SERPL-MCNC: 9.2 MG/DL — SIGNIFICANT CHANGE UP (ref 8.4–10.5)
CHLORIDE SERPL-SCNC: 100 MMOL/L — SIGNIFICANT CHANGE UP (ref 98–110)
CO2 SERPL-SCNC: 29 MMOL/L — SIGNIFICANT CHANGE UP (ref 17–32)
CREAT SERPL-MCNC: 1.2 MG/DL — SIGNIFICANT CHANGE UP (ref 0.7–1.5)
EGFR: 70 ML/MIN/1.73M2 — SIGNIFICANT CHANGE UP
EOSINOPHIL # BLD AUTO: 0.17 K/UL — SIGNIFICANT CHANGE UP (ref 0–0.7)
EOSINOPHIL NFR BLD AUTO: 3.4 % — SIGNIFICANT CHANGE UP (ref 0–8)
GLUCOSE SERPL-MCNC: 84 MG/DL — SIGNIFICANT CHANGE UP (ref 70–99)
HCT VFR BLD CALC: 40 % — LOW (ref 42–52)
HGB BLD-MCNC: 13.9 G/DL — LOW (ref 14–18)
IMM GRANULOCYTES NFR BLD AUTO: 0 % — LOW (ref 0.1–0.3)
LYMPHOCYTES # BLD AUTO: 2.73 K/UL — SIGNIFICANT CHANGE UP (ref 1.2–3.4)
LYMPHOCYTES # BLD AUTO: 55.4 % — HIGH (ref 20.5–51.1)
MAGNESIUM SERPL-MCNC: 2 MG/DL — SIGNIFICANT CHANGE UP (ref 1.8–2.4)
MCHC RBC-ENTMCNC: 30.3 PG — SIGNIFICANT CHANGE UP (ref 27–31)
MCHC RBC-ENTMCNC: 34.8 G/DL — SIGNIFICANT CHANGE UP (ref 32–37)
MCV RBC AUTO: 87.1 FL — SIGNIFICANT CHANGE UP (ref 80–94)
MONOCYTES # BLD AUTO: 0.33 K/UL — SIGNIFICANT CHANGE UP (ref 0.1–0.6)
MONOCYTES NFR BLD AUTO: 6.7 % — SIGNIFICANT CHANGE UP (ref 1.7–9.3)
NEUTROPHILS # BLD AUTO: 1.67 K/UL — SIGNIFICANT CHANGE UP (ref 1.4–6.5)
NEUTROPHILS NFR BLD AUTO: 33.9 % — LOW (ref 42.2–75.2)
NRBC # BLD: 0 /100 WBCS — SIGNIFICANT CHANGE UP (ref 0–0)
PLATELET # BLD AUTO: 230 K/UL — SIGNIFICANT CHANGE UP (ref 130–400)
POTASSIUM SERPL-MCNC: 4.3 MMOL/L — SIGNIFICANT CHANGE UP (ref 3.5–5)
POTASSIUM SERPL-SCNC: 4.3 MMOL/L — SIGNIFICANT CHANGE UP (ref 3.5–5)
PROT SERPL-MCNC: 7.4 G/DL — SIGNIFICANT CHANGE UP (ref 6–8)
RBC # BLD: 4.59 M/UL — LOW (ref 4.7–6.1)
RBC # FLD: 11.9 % — SIGNIFICANT CHANGE UP (ref 11.5–14.5)
SODIUM SERPL-SCNC: 138 MMOL/L — SIGNIFICANT CHANGE UP (ref 135–146)
TROPONIN T SERPL-MCNC: <0.01 NG/ML — SIGNIFICANT CHANGE UP
WBC # BLD: 4.93 K/UL — SIGNIFICANT CHANGE UP (ref 4.8–10.8)
WBC # FLD AUTO: 4.93 K/UL — SIGNIFICANT CHANGE UP (ref 4.8–10.8)

## 2022-09-27 PROCEDURE — 71046 X-RAY EXAM CHEST 2 VIEWS: CPT | Mod: 26

## 2022-09-27 PROCEDURE — 99285 EMERGENCY DEPT VISIT HI MDM: CPT

## 2022-09-27 PROCEDURE — 93010 ELECTROCARDIOGRAM REPORT: CPT

## 2022-09-27 RX ORDER — SODIUM CHLORIDE 9 MG/ML
1000 INJECTION INTRAMUSCULAR; INTRAVENOUS; SUBCUTANEOUS ONCE
Refills: 0 | Status: COMPLETED | OUTPATIENT
Start: 2022-09-27 | End: 2022-09-27

## 2022-09-27 RX ADMIN — SODIUM CHLORIDE 1000 MILLILITER(S): 9 INJECTION INTRAMUSCULAR; INTRAVENOUS; SUBCUTANEOUS at 21:25

## 2022-09-27 NOTE — ED PROVIDER NOTE - NSFOLLOWUPCLINICS_GEN_ALL_ED_FT
[facility]  Medicine  [address], [place]   Phone: [phone]  Fax:   Follow Up Time: 4-6 Days

## 2022-09-27 NOTE — ED PROVIDER NOTE - OBJECTIVE STATEMENT
57 year old M denies pmhx (sts has not seen pmd > 20 years) presenting to er with mult complaints. Pt sts for the past 4 days has had intermittent L sided HA with assoc tingling of hands and feet. CHAMORRO is relieved after taking Excedrin. Pt also c/o intermittent chest tightness and lightheadedness + former smoker. Pt c/o dizziness (room spinning sensation), nausea, vomiting, visual changes, neck pain/stiffness, fever/chills/uri symptoms, leg pain/swelling, cardiac hx, recent travel, extremity weakness, unsteady gait.

## 2022-09-27 NOTE — ED PROVIDER NOTE - PHYSICAL EXAMINATION
CONSTITUTIONAL: Well-appearing; well-nourished; in no apparent distress.   EYES: PERRL; EOM intact.    NECK: Supple; non-tender; no cervical lymphadenopathy.   CARDIOVASCULAR: Normal S1, S2; no murmurs, rubs, or gallops.   RESPIRATORY: Normal chest excursion with respiration; breath sounds clear and equal bilaterally; no wheezes, rhonchi, or rales.  GI/: Normal bowel sounds; non-distended; non-tender; no palpable organomegaly.   MS: No evidence of trauma or deformity. Normal ROM in all four extremities; non-tender to palpation; distal pulses are normal.   SKIN: Normal for age and race; warm; dry; good turgor; no apparent lesions or exudate.   NEURO/PSYCH: A & O x 4; grossly unremarkable. mood and manner are appropriate. No focal deficits. No facial droop. No tongue deviation. Cerebellar intact. Normal gait. Sensation intact

## 2022-09-27 NOTE — ED PROVIDER NOTE - NS ED ROS FT
Constitutional: no fever, chills, no recent weight loss, change in appetite or malaise  Eyes: no redness/discharge/pain/vision changes  ENT: no rhinorrhea/ear pain/sore throat  Cardiac: + chest tightness. No SOB or edema.  Respiratory: No cough or respiratory distress  GI: No nausea, vomiting, diarrhea or abdominal pain.  : No dysuria, frequency, urgency or hematuria  MS: no pain to back or extremities, no loss of ROM, no weakness  Neuro: + HA and lightheadedness. No LOC.  Skin: No skin rash.  Endocrine: No history of thyroid disease or diabetes.  Except as documented in the HPI, all other systems are negative.

## 2022-09-27 NOTE — ED PROVIDER NOTE - ATTENDING APP SHARED VISIT CONTRIBUTION OF CARE
57-year-old male with no past medical history presents to ED for tingling of fingers and feet for the past 3 weeks as well as intermittent headaches for the past 4 days.  Patient states her headache is resolved with Excedrin.  No fever chills shortness of breath chest pain abdominal pain.     Const: NAD  Eyes: PERRL, no conjunctival injection  HENT:  Neck supple without meningismus   CV: RRR, Warm, well-perfused extremities  RESP: CTA B/L, no tachypnea   GI: soft, non-tender, non-distended  MSK: No gross deformities appreciated  Skin: Warm, dry. No rashes  Neuro: Alert, CNs II-XII grossly intact. Sensation and motor function of extremities grossly intact.  Psych: Appropriate mood and affect.    will do basic labs

## 2022-09-27 NOTE — ED ADULT NURSE NOTE - OBJECTIVE STATEMENT
Patient presents for lightheadedness and dizziness for three weeks.  States that he has tingling to fingertips and toes.  Reports that today he was experiencing he has headache.

## 2022-09-27 NOTE — ED PROVIDER NOTE - PATIENT PORTAL LINK FT
You can access the FollowMyHealth Patient Portal offered by [facility] by registering at the following website: [email or web address]. By joining [email or web address]’s FollowMyHealth portal, you will also be able to view your health information using other applications (apps) compatible with our system.

## 2022-09-27 NOTE — ED PROVIDER NOTE - CLINICAL SUMMARY MEDICAL DECISION MAKING FREE TEXT BOX
57-year-old male presents to the ED for tingling to his bilateral hands and feet concerning for neuropathy.  Patient's labs within normal limits chest x-ray normal EKG within normal limits no chest pain.  Will discharge home with medicine clinic follow-up and neurology.  Strict return precautions given and patient understands.

## 2022-09-27 NOTE — ED ADULT NURSE NOTE - NSIMPLEMENTINTERV_GEN_ALL_ED
Implemented All Universal Safety Interventions:  [place] to call system. Call bell, personal items and telephone within reach. Instruct patient to call for assistance. Room bathroom lighting operational. Non-slip footwear when patient is off stretcher. Physically safe environment: no spills, clutter or unnecessary equipment. Stretcher in lowest position, wheels locked, appropriate side rails in place.

## 2022-09-27 NOTE — ED PROVIDER NOTE - NSFOLLOWUPINSTRUCTIONS_ED_ALL_ED_FT
EnglishSpanish                                                                                                         Peripheral Neuropathy      Peripheral neuropathy is a type of nerve damage. It affects nerves that carry signals between the spinal cord and the arms, legs, and the rest of the body (peripheral nerves). It does not affect nerves in the spinal cord or brain. In peripheral neuropathy, one nerve or a group of nerves may be damaged. Peripheral neuropathy is a broad category that includes many specific nerve disorders, like diabetic neuropathy, hereditary neuropathy, and carpal tunnel syndrome.      What are the causes?    This condition may be caused by:  •Diabetes. This is the most common cause of peripheral neuropathy.      •Nerve injury.      •Pressure or stress on a nerve that lasts a long time.      •Lack (deficiency) of B vitamins. This can result from alcoholism, poor diet, or a restricted diet.      •Infections.      •Autoimmune diseases, such as rheumatoid arthritis and systemic lupus erythematosus.      •Nerve diseases that are passed from parent to child (inherited).      •Some medicines, such as cancer medicines (chemotherapy).      •Poisonous (toxic) substances, such as lead and mercury.      •Too little blood flowing to the legs.      •Kidney disease.      •Thyroid disease.      In some cases, the cause of this condition is not known.      What are the signs or symptoms?    Symptoms of this condition depend on which of your nerves is damaged. Common symptoms include:  •Loss of feeling (numbness) in the feet, hands, or both.      •Tingling in the feet, hands, or both.      •Burning pain.      •Very sensitive skin.      •Weakness.      •Not being able to move a part of the body (paralysis).      •Muscle twitching.      •Clumsiness or poor coordination.      •Loss of balance.      •Not being able to control your bladder.      •Feeling dizzy.      •Sexual problems.        How is this diagnosed?    Diagnosing and finding the cause of peripheral neuropathy can be difficult. Your health care provider will take your medical history and do a physical exam. A neurological exam will also be done. This involves checking things that are affected by your brain, spinal cord, and nerves (nervous system). For example, your health care provider will check your reflexes, how you move, and what you can feel.    You may have other tests, such as:  •Blood tests.      •Electromyogram (EMG) and nerve conduction tests. These tests check nerve function and how well the nerves are controlling the muscles.      •Imaging tests, such as CT scans or MRI to rule out other causes of your symptoms.      •Removing a small piece of nerve to be examined in a lab (nerve biopsy).      •Removing and examining a small amount of the fluid that surrounds the brain and spinal cord (lumbar puncture).        How is this treated?    Treatment for this condition may involve:  •Treating the underlying cause of the neuropathy, such as diabetes, kidney disease, or vitamin deficiencies.      •Stopping medicines that can cause neuropathy, such as chemotherapy.    •Medicine to help relieve pain. Medicines may include:  •Prescription or over-the-counter pain medicine.      •Antiseizure medicine.      •Antidepressants.      •Pain-relieving patches that are applied to painful areas of skin.        •Surgery to relieve pressure on a nerve or to destroy a nerve that is causing pain.      •Physical therapy to help improve movement and balance.      •Devices to help you move around (assistive devices).        Follow these instructions at home:    Medicines     •Take over-the-counter and prescription medicines only as told by your health care provider. Do not take any other medicines without first asking your health care provider.      • Do not drive or use heavy machinery while taking prescription pain medicine.        Lifestyle      • Do not use any products that contain nicotine or tobacco, such as cigarettes and e-cigarettes. Smoking keeps blood from reaching damaged nerves. If you need help quitting, ask your health care provider.      •Avoid or limit alcohol. Too much alcohol can cause a vitamin B deficiency, and vitamin B is needed for healthy nerves.    •Eat a healthy diet. This includes:  •Eating foods that are high in fiber, such as fresh fruits and vegetables, whole grains, and beans.      •Limiting foods that are high in fat and processed sugars, such as fried or sweet foods.          General instructions      •If you have diabetes, work closely with your health care provider to keep your blood sugar under control.    •If you have numbness in your feet:  •Check every day for signs of injury or infection. Watch for redness, warmth, and swelling.      •Wear padded socks and comfortable shoes. These help protect your feet.        •Develop a good support system. Living with peripheral neuropathy can be stressful. Consider talking with a mental health specialist or joining a support group.      •Use assistive devices and attend physical therapy as told by your health care provider. This may include using a walker or a cane.      •Keep all follow-up visits as told by your health care provider. This is important.        Contact a health care provider if:    •You have new signs or symptoms of peripheral neuropathy.      •You are struggling emotionally from dealing with peripheral neuropathy.      •Your pain is not well-controlled.        Get help right away if:    •You have an injury or infection that is not healing normally.      •You develop new weakness in an arm or leg.      •You have fallen or do so frequently.        Summary    •Peripheral neuropathy is when the nerves in the arms, or legs are damaged, resulting in numbness, weakness, or pain.      •There are many causes of peripheral neuropathy, including diabetes, pinched nerves, vitamin deficiencies, autoimmune disease, and hereditary conditions.      •Diagnosing and finding the cause of peripheral neuropathy can be difficult. Your health care provider will take your medical history, do a physical exam, and do tests, including blood tests and nerve function tests.      •Treatment involves treating the underlying cause of the neuropathy and taking medicines to help control pain. Physical therapy and assistive devices may also help.      This information is not intended to replace advice given to you by your health care provider. Make sure you discuss any questions you have with your health care provider.      Document Revised: 09/28/2021 Document Reviewed: 09/28/2021    Elsevier Patient Education © 2022 [employer]

## 2022-10-05 ENCOUNTER — OUTPATIENT (OUTPATIENT)
Dept: OUTPATIENT SERVICES | Facility: HOSPITAL | Age: 57
LOS: 1 days | Discharge: HOME | End: 2022-10-05

## 2022-10-05 ENCOUNTER — APPOINTMENT (OUTPATIENT)
Dept: INTERNAL MEDICINE | Facility: CLINIC | Age: 57
End: 2022-10-05

## 2022-10-05 VITALS
DIASTOLIC BLOOD PRESSURE: 85 MMHG | BODY MASS INDEX: 25.8 KG/M2 | TEMPERATURE: 98.5 F | SYSTOLIC BLOOD PRESSURE: 124 MMHG | OXYGEN SATURATION: 98 % | HEART RATE: 65 BPM | HEIGHT: 74 IN | WEIGHT: 201 LBS

## 2022-10-05 DIAGNOSIS — Z87.19 PERSONAL HISTORY OF OTHER DISEASES OF THE DIGESTIVE SYSTEM: Chronic | ICD-10-CM

## 2022-10-05 PROCEDURE — 99203 OFFICE O/P NEW LOW 30 MIN: CPT | Mod: GC

## 2022-10-05 PROCEDURE — 99213 OFFICE O/P EST LOW 20 MIN: CPT

## 2022-10-05 NOTE — HISTORY OF PRESENT ILLNESS
[FreeTextEntry1] : establish care [de-identified] : patient is a 57 year old M denies pmhx (states has not seen pmd > 20 years) presents for establish care. patient was recently seen in the [facility] ED sep 27 for numbing and tingling in his feet. patient states he has been having tingling of hands and feet for the past year, patient states sometime he feels like he is walking on sand, patient states he cant feel anything on the bottoms of his feet. patient also endorses intermittent dizziness. patient states his symptoms are triggered by eating cheese. most recent EGD and colonoscopy was a year ago and it was normal. patient has a neurology appoint at oct 16th

## 2022-10-05 NOTE — ASSESSMENT
[FreeTextEntry1] : patient is a 57 year old M denies pmhx (states has not seen pmd > 20 years) presents for establish care/hospital follow up.\par \par #peripheral neuropathy\par -patient endorses loss of sensation on b/l plantar aspect of his feet with numbness x 1 year\par -triggered by cheese?\par -check a1c, b12, mma,spep, lyme, syphillis, hiv, kel\par -neurology follow up\par \par #hcm\par -routine blood work today\par -colonoscopy screening up to date\par -covid vaccine up to date\par -rtc 3 month or prn

## 2022-10-05 NOTE — PHYSICAL EXAM
[Normal] : soft, non-tender, non-distended, no masses palpated, no HSM and normal bowel sounds [de-identified] : no wound on b/l feet, lost of sensation on b/l plantar aspect of feet

## 2022-10-06 DIAGNOSIS — Z00.00 ENCOUNTER FOR GENERAL ADULT MEDICAL EXAMINATION WITHOUT ABNORMAL FINDINGS: ICD-10-CM

## 2022-10-20 LAB
25(OH)D3 SERPL-MCNC: 25 NG/ML
ALBUMIN SERPL ELPH-MCNC: 4.8 G/DL
ALP BLD-CCNC: 87 U/L
ALT SERPL-CCNC: 21 U/L
ANION GAP SERPL CALC-SCNC: 12 MMOL/L
AST SERPL-CCNC: 22 U/L
BASOPHILS # BLD AUTO: 0.02 K/UL
BASOPHILS NFR BLD AUTO: 0.5 %
BILIRUB SERPL-MCNC: 1.8 MG/DL
BUN SERPL-MCNC: 13 MG/DL
CALCIUM SERPL-MCNC: 9.6 MG/DL
CHLORIDE SERPL-SCNC: 102 MMOL/L
CHOLEST SERPL-MCNC: 156 MG/DL
CO2 SERPL-SCNC: 26 MMOL/L
CREAT SERPL-MCNC: 1.2 MG/DL
EGFR: 70 ML/MIN/1.73M2
EOSINOPHIL # BLD AUTO: 0.12 K/UL
EOSINOPHIL NFR BLD AUTO: 2.8 %
ESTIMATED AVERAGE GLUCOSE: 100 MG/DL
GLUCOSE SERPL-MCNC: 91 MG/DL
HBA1C MFR BLD HPLC: 5.1 %
HCT VFR BLD CALC: 42.2 %
HDLC SERPL-MCNC: 32 MG/DL
HGB BLD-MCNC: 14.2 G/DL
IMM GRANULOCYTES NFR BLD AUTO: 0.2 %
LDLC SERPL CALC-MCNC: 87 MG/DL
LYMPHOCYTES # BLD AUTO: 2.06 K/UL
LYMPHOCYTES NFR BLD AUTO: 48.4 %
MAGNESIUM SERPL-MCNC: 2.1 MG/DL
MAN DIFF?: NORMAL
MCHC RBC-ENTMCNC: 30.4 PG
MCHC RBC-ENTMCNC: 33.6 G/DL
MCV RBC AUTO: 90.4 FL
MONOCYTES # BLD AUTO: 0.3 K/UL
MONOCYTES NFR BLD AUTO: 7 %
NEUTROPHILS # BLD AUTO: 1.75 K/UL
NEUTROPHILS NFR BLD AUTO: 41.1 %
NONHDLC SERPL-MCNC: 124 MG/DL
PLATELET # BLD AUTO: 250 K/UL
POTASSIUM SERPL-SCNC: 4.3 MMOL/L
PROT SERPL-MCNC: 7.8 G/DL
RBC # BLD: 4.67 M/UL
RBC # FLD: 12 %
SODIUM SERPL-SCNC: 140 MMOL/L
TRIGL SERPL-MCNC: 186 MG/DL
WBC # FLD AUTO: 4.26 K/UL

## 2022-11-08 ENCOUNTER — OUTPATIENT (OUTPATIENT)
Dept: OUTPATIENT SERVICES | Facility: HOSPITAL | Age: 57
LOS: 1 days | Discharge: HOME | End: 2022-11-08

## 2022-11-08 DIAGNOSIS — R51.9 HEADACHE, UNSPECIFIED: ICD-10-CM

## 2022-11-08 DIAGNOSIS — Z87.19 PERSONAL HISTORY OF OTHER DISEASES OF THE DIGESTIVE SYSTEM: Chronic | ICD-10-CM

## 2022-11-08 PROCEDURE — 70450 CT HEAD/BRAIN W/O DYE: CPT | Mod: 26

## 2022-11-15 ENCOUNTER — NON-APPOINTMENT (OUTPATIENT)
Age: 57
End: 2022-11-15

## 2022-11-15 ENCOUNTER — OUTPATIENT (OUTPATIENT)
Dept: OUTPATIENT SERVICES | Facility: HOSPITAL | Age: 57
LOS: 1 days | Discharge: HOME | End: 2022-11-15

## 2022-11-15 ENCOUNTER — APPOINTMENT (OUTPATIENT)
Dept: NEUROLOGY | Facility: CLINIC | Age: 57
End: 2022-11-15

## 2022-11-15 VITALS
BODY MASS INDEX: 26.1 KG/M2 | DIASTOLIC BLOOD PRESSURE: 94 MMHG | TEMPERATURE: 98.4 F | WEIGHT: 203.4 LBS | HEIGHT: 74 IN | OXYGEN SATURATION: 97 % | HEART RATE: 64 BPM | SYSTOLIC BLOOD PRESSURE: 147 MMHG

## 2022-11-15 DIAGNOSIS — Z12.11 ENCOUNTER FOR SCREENING FOR MALIGNANT NEOPLASM OF COLON: ICD-10-CM

## 2022-11-15 DIAGNOSIS — Z87.19 PERSONAL HISTORY OF OTHER DISEASES OF THE DIGESTIVE SYSTEM: Chronic | ICD-10-CM

## 2022-11-15 PROCEDURE — 99203 OFFICE O/P NEW LOW 30 MIN: CPT

## 2022-11-15 RX ORDER — FAMOTIDINE 20 MG/1
20 TABLET, FILM COATED ORAL
Qty: 60 | Refills: 0 | Status: DISCONTINUED | COMMUNITY
Start: 2021-02-08 | End: 2022-11-15

## 2022-11-15 RX ORDER — OMEPRAZOLE 40 MG/1
40 CAPSULE, DELAYED RELEASE ORAL
Qty: 30 | Refills: 2 | Status: DISCONTINUED | COMMUNITY
Start: 2021-02-04 | End: 2022-11-15

## 2022-11-15 RX ORDER — PANTOPRAZOLE 40 MG/1
40 TABLET, DELAYED RELEASE ORAL
Qty: 180 | Refills: 3 | Status: DISCONTINUED | COMMUNITY
Start: 2021-04-29 | End: 2022-11-15

## 2022-11-17 PROBLEM — Z12.11 COLON CANCER SCREENING: Status: ACTIVE | Noted: 2021-02-04

## 2022-11-17 NOTE — ASSESSMENT
[FreeTextEntry1] : 56 yo M w/o any PMH p/w tingling and burning pain in his both soles x 2-3 y which consistent w mild sensory polyneuropathy. He denies EtOH, drugs, heavy metal exposure, recent A1C, CBC, CMP was normal. Today's neuro exam was remarkable for brisk DTR, decreased sensation for light touch, vibration on his b/l soles, additionally he complains for lightheadedness which most likely related to dehydration. The etiology of  his sensory polyneuropathy unclear but routine lab work is warranted. \par \par Plan: \par \par 1. Blood work: Serum Vit B12, B1, B6 level, TSH\par 2. Trial Alpha lipoic acid 600 mg/ daily \par 3. Hydration, Vit D daily\par 4. F/u in 6 months\par \par The case was discussed w Dr. Blackmon\par \par

## 2022-11-17 NOTE — PHYSICAL EXAM
[FreeTextEntry1] : NEUROLOGICAL EXAM: \par \par General:  Appearance is consistent with chronologic age.  \par \par Cognitive/Language:  Awake, alert, and oriented to person, place, time and date.  Recent and remote memory intact.  Fund of knowledge is appropriate.  Naming, repetition and comprehension intact. Non dysarthric.   \par \par Cranial Nerves:  \par - Eyes: Visual acuity intact, Visual fields full on confrontation.  EOMI w/o nystagmus, skew or reported double vision.  PERRL.  No ptosis/weakness of eyelid closure.   \par - Face:  Facial sensation normal V1 - 3, no facial asymmetry.   \par - Ears/Nose/Throat:  Hearing grossly intact b/l to finger rub.  Palate elevates midline.  Tongue and uvula midline.  \par \par Motor exam:       Right | Left  \par \par Shoulder abductors: 5|5 \par Shoulder adductors: 5|5 \par Elbow flexors:          5|5 \par Elbow extensors:     5|5 \par Palmar flexion:          5|5 \par Palmar dorsiflexion:  5|5 \par Hip flexors:               5|5 \par Hip extensors:          5|5 \par Knee extensors:       5|5 \par Knee flexors:            5|5 \par Foot dorsiflexors:     5|5 \par Foot plantarflexors:  5|5 \par Foot inversion:          5|5\par Foot eversion:           5|5\par \par DTRs:             Right | Left \par \par Biceps:                 2+|2+   \par Triceps:                2+|2+ \par Brachioradialis:    2+|2+   \par Patellar:                2+|2+ \par Achilles:               2+|2+ \par \par Plantar responses downward on the right, downward on the left. \par Frontal release signs absent on the right, absent on the left. \par \par Sensory examination:  Sensory examination:  decreased light touch, vibration in both soles. \par \par Cerebellum:   FTN/HKS intact. No dysmetria. Romberg test is negative.    \par \par Negative Kernig and Brudzinski signs.  \par \par Gait narrow based\par

## 2022-11-17 NOTE — HISTORY OF PRESENT ILLNESS
[FreeTextEntry1] : 57 year old M w/o  significant PMH is here for tingling on his feet, episodes of lightheadedness. \par Started approximately 2-3 y ago, with tips of his feet and gradually evolved to whole both soles with "pin and needles". Stating that it getting worse. Saw by PCP and did some blood work CBC, CMP, A1C wnl. \par Lightheadedness: Episodes of lightheadedness started maybe a year ago. \par \par Soc Hx: , have kids, no smoking, no EtOH, , \par PSHx: Inguinal hernia repair \par FHx: non-contributory \par \par Reviewed:\par Labs - A1C 5.1%, CMP normal, CBC with low WBC 4.26

## 2022-11-17 NOTE — END OF VISIT
[] : Resident [FreeTextEntry3] : \par history and exam consistent with very mild sensory neuropathy\par check labs above\par start alpha lipoic acid\par f/u 6 months

## 2022-11-29 NOTE — ED ADULT TRIAGE NOTE - BP NONINVASIVE DIASTOLIC (MM HG)
Normal Normal Normal 83 Normal Normal Normal Normal Normal Normal Normal Normal Normal Normal Normal Normal Normal

## 2023-01-23 ENCOUNTER — APPOINTMENT (OUTPATIENT)
Dept: INTERNAL MEDICINE | Facility: CLINIC | Age: 58
End: 2023-01-23
Payer: MEDICAID

## 2023-01-23 ENCOUNTER — OUTPATIENT (OUTPATIENT)
Dept: OUTPATIENT SERVICES | Facility: HOSPITAL | Age: 58
LOS: 1 days | Discharge: HOME | End: 2023-01-23

## 2023-01-23 VITALS
TEMPERATURE: 98 F | SYSTOLIC BLOOD PRESSURE: 121 MMHG | HEART RATE: 76 BPM | HEIGHT: 74 IN | DIASTOLIC BLOOD PRESSURE: 75 MMHG | BODY MASS INDEX: 25.67 KG/M2 | OXYGEN SATURATION: 99 % | WEIGHT: 200 LBS

## 2023-01-23 DIAGNOSIS — Z87.19 PERSONAL HISTORY OF OTHER DISEASES OF THE DIGESTIVE SYSTEM: Chronic | ICD-10-CM

## 2023-01-23 PROCEDURE — 99214 OFFICE O/P EST MOD 30 MIN: CPT | Mod: GC

## 2023-01-23 NOTE — HISTORY OF PRESENT ILLNESS
[FreeTextEntry1] : routine follow-up [de-identified] : 57 year old M with PMHx peripheral neuropathy presenting to the clinic for routine follow-up. Complaining of worsening neuropathy. Concerned about lipoma on abdomen.

## 2023-01-23 NOTE — PHYSICAL EXAM
[No Acute Distress] : no acute distress [Well Nourished] : well nourished [Well Developed] : well developed [Well-Appearing] : well-appearing [Normal Sclera/Conjunctiva] : normal sclera/conjunctiva [PERRL] : pupils equal round and reactive to light [EOMI] : extraocular movements intact [Normal Outer Ear/Nose] : the outer ears and nose were normal in appearance [Normal Oropharynx] : the oropharynx was normal [No JVD] : no jugular venous distention [No Lymphadenopathy] : no lymphadenopathy [Supple] : supple [Thyroid Normal, No Nodules] : the thyroid was normal and there were no nodules present [No Respiratory Distress] : no respiratory distress  [No Accessory Muscle Use] : no accessory muscle use [Clear to Auscultation] : lungs were clear to auscultation bilaterally [Normal Rate] : normal rate  [Regular Rhythm] : with a regular rhythm [Normal S1, S2] : normal S1 and S2 [No Murmur] : no murmur heard [No Carotid Bruits] : no carotid bruits [No Abdominal Bruit] : a ~M bruit was not heard ~T in the abdomen [No Varicosities] : no varicosities [Pedal Pulses Present] : the pedal pulses are present [No Edema] : there was no peripheral edema [No Palpable Aorta] : no palpable aorta [No Extremity Clubbing/Cyanosis] : no extremity clubbing/cyanosis [Soft] : abdomen soft [Non Tender] : non-tender [Non-distended] : non-distended [No Masses] : no abdominal mass palpated [No HSM] : no HSM [Normal Bowel Sounds] : normal bowel sounds [Normal Posterior Cervical Nodes] : no posterior cervical lymphadenopathy [Normal Anterior Cervical Nodes] : no anterior cervical lymphadenopathy [No CVA Tenderness] : no CVA  tenderness [No Spinal Tenderness] : no spinal tenderness [No Joint Swelling] : no joint swelling [Grossly Normal Strength/Tone] : grossly normal strength/tone [No Rash] : no rash [Coordination Grossly Intact] : coordination grossly intact [No Focal Deficits] : no focal deficits [Normal Gait] : normal gait [Deep Tendon Reflexes (DTR)] : deep tendon reflexes were 2+ and symmetric [Normal Affect] : the affect was normal [Normal Insight/Judgement] : insight and judgment were intact [de-identified] : lipoma [de-identified] : decreased sensation on b/l plantar surface of feet

## 2023-01-23 NOTE — ASSESSMENT
[FreeTextEntry1] : 57 year old M with PMHx peripheral neuropathy presenting to the clinic for routine follow-up.\par \par # Peripheral neuropathy\par - loss of sensation on b/l plantar aspect of his feet with numbness x 1 year, worsened since last visit \par - triggered by cheese?\par - A1c 5.1, B12/B1/B6 wnl\par - Vit D 25\par - needs HIV, Lyme, MMA/homocysteine, Syphilis, CHELSEA\par - neurology follow-up \par - trial alpha lipoic acid 600 mg daily \par \par # Arthritis\par - L knee an b/l wrists\par - better w/ activity \par - worse w/ sugar and cheese \par \par # Vit D deficiency, 25\par - c/w Vit D supplementation \par \par # DLD\par - , LDL 87, chol 156, HDL 32\par \par # Gastric ulcer (non-H.pylori related)\par - avoid NSAIDs\par \par # HCM\par - needs labs\par - colonoscopy screening normal, repeat 2031\par - covid vaccine up to date\par - RTC 3 month or PRN\par

## 2023-01-24 DIAGNOSIS — G62.9 POLYNEUROPATHY, UNSPECIFIED: ICD-10-CM

## 2023-01-24 DIAGNOSIS — Z00.00 ENCOUNTER FOR GENERAL ADULT MEDICAL EXAMINATION WITHOUT ABNORMAL FINDINGS: ICD-10-CM

## 2023-01-26 ENCOUNTER — APPOINTMENT (OUTPATIENT)
Dept: ORTHOPEDIC SURGERY | Facility: CLINIC | Age: 58
End: 2023-01-26
Payer: MEDICAID

## 2023-01-26 VITALS — HEIGHT: 75 IN | BODY MASS INDEX: 24.87 KG/M2 | WEIGHT: 200 LBS

## 2023-01-26 PROCEDURE — 99203 OFFICE O/P NEW LOW 30 MIN: CPT

## 2023-01-26 PROCEDURE — 73562 X-RAY EXAM OF KNEE 3: CPT | Mod: LT

## 2023-01-26 NOTE — HISTORY OF PRESENT ILLNESS
[de-identified] : 57-year-old male comes in today for evaluation of his left knee pain that began on January 22nd.  Patient states that he was exercising doing some leg extensions and then began to feel pain in his left knee.

## 2023-01-26 NOTE — IMAGING
[de-identified] :   Examination of the left knee mild swelling, no knee effusion, no ecchymosis, no erythema.  Skin is intact.  He is able to straight leg raise.  He is able to actively flex and extend the knee although with slight restriction through terminal knee flexion and extension.  Stable to varus and valgus stress testing.  Tenderness is noted along the lateral joint line, mild tenderness along the lateral collateral ligament.  Mild tenderness over the fibula.  No tenderness along medial joint line.  No tenderness over the patella, patella tendon or quadriceps tendon.  Calf is soft.  He appears to be ambulating well.\par \par X-ray left knee moderate tricompartmental osteoarthritis, worse lateral compartment.  Irregularity around the fibula that appears to be chronic.

## 2023-01-26 NOTE — ASSESSMENT
[FreeTextEntry1] :   At this time explained to patient he most likely aggravated the arthritis in his knee.  We discussed icing, anti-inflammatory as needed.  Knee sleeve for support.  Nabumetone was sent to the pharmacy.  Caution him the use of NSAIDs with his stomach.  He states he has had ulcers in the past although not currently being treated for one.  He was advised to stop if he has any side effects.  Follow-up in the office in several weeks for repeat evaluation.\par \par This patient was seen under the supervision of Dr. Fairbanks.\par

## 2023-01-31 LAB
TSH SERPL-ACNC: 2.1 UIU/ML
VIT B1 SERPL-MCNC: 105.1 NMOL/L
VIT B12 SERPL-MCNC: 565 PG/ML
VIT B6 SERPL-MCNC: 16.3 UG/L

## 2023-02-03 ENCOUNTER — APPOINTMENT (OUTPATIENT)
Dept: SURGERY | Facility: CLINIC | Age: 58
End: 2023-02-03
Payer: MEDICAID

## 2023-02-03 ENCOUNTER — NON-APPOINTMENT (OUTPATIENT)
Age: 58
End: 2023-02-03

## 2023-02-03 ENCOUNTER — LABORATORY RESULT (OUTPATIENT)
Age: 58
End: 2023-02-03

## 2023-02-03 VITALS
HEIGHT: 75 IN | DIASTOLIC BLOOD PRESSURE: 86 MMHG | BODY MASS INDEX: 25.49 KG/M2 | WEIGHT: 205 LBS | SYSTOLIC BLOOD PRESSURE: 138 MMHG | OXYGEN SATURATION: 98 % | TEMPERATURE: 97.3 F | HEART RATE: 78 BPM

## 2023-02-03 PROCEDURE — 22903 EXC ABD LES SC 3 CM/>: CPT

## 2023-02-03 NOTE — PROCEDURE
[FreeTextEntry1] : This is a healthy patient who reports a soft, mobile mass as right lower chest/upper abdominal wall.  He has had it for several years.  He reports that it may have grown a bit in the last several months and is starting to bother him.  He denies any erythema or drainage or other symptoms.  On physical exam the mass is consistent with a lipoma.  It is soft and very mobile.\par After informed consent was obtained the area was prepped and draped in the usual fashion.  Local anesthesia was administered with 1% Xylocaine and a 3 cm oblique incision was made over the mass.  Dissection was proceeded sharply down into the capsule of the lesion.  At this point the lesion could be dissected out using combination of sharp and blunt dissection and removed.  It was fatty and somewhat lobular but well-circumscribed.  At this point the deep tissues were closed with 3-0 Vicryl.  Hemostasis was good.  The skin was closed with 4-0 Monocryl and Dermabond was applied as dressing.  The patient tolerated the procedure well.

## 2023-02-03 NOTE — ASSESSMENT
[FreeTextEntry1] : Lipoma of right upper abdominal wall removed and sent for pathology.  I will see the patient back in 2 weeks for wound check.

## 2023-02-17 ENCOUNTER — APPOINTMENT (OUTPATIENT)
Dept: SURGERY | Facility: CLINIC | Age: 58
End: 2023-02-17
Payer: MEDICAID

## 2023-02-17 VITALS
BODY MASS INDEX: 25.49 KG/M2 | HEIGHT: 75 IN | TEMPERATURE: 97.3 F | DIASTOLIC BLOOD PRESSURE: 82 MMHG | OXYGEN SATURATION: 97 % | HEART RATE: 92 BPM | SYSTOLIC BLOOD PRESSURE: 116 MMHG | WEIGHT: 205 LBS

## 2023-02-17 PROCEDURE — 99024 POSTOP FOLLOW-UP VISIT: CPT

## 2023-02-17 NOTE — HISTORY OF PRESENT ILLNESS
[de-identified] : This patient is status post excision of a lipoma on his right anterior chest wall.  Final pathology revealed benign lipoma.  The patient has no complaints.  No fevers or chills, no issues with his wound.

## 2023-02-17 NOTE — ASSESSMENT
[FreeTextEntry1] : The patient is doing well.  No further surgical follow-up is needed I instructed him he can follow-up with his primary care doctor and call for follow-up appointment if any other issues arise.

## 2023-02-28 ENCOUNTER — APPOINTMENT (OUTPATIENT)
Dept: ORTHOPEDIC SURGERY | Facility: CLINIC | Age: 58
End: 2023-02-28
Payer: MEDICAID

## 2023-02-28 PROCEDURE — 20610 DRAIN/INJ JOINT/BURSA W/O US: CPT | Mod: LT

## 2023-02-28 PROCEDURE — 99213 OFFICE O/P EST LOW 20 MIN: CPT | Mod: 25

## 2023-02-28 NOTE — PHYSICAL EXAM
[Left] : left knee [NL (0)] : extension 0 degrees [] : patient ambulates without assistive device [FreeTextEntry3] : Slight valgus alignment noted. [FreeTextEntry8] : Tenderness to palpation over the posterior aspect of the lateral joint line.  [TWNoteComboBox7] : flexion 120 degrees

## 2023-02-28 NOTE — PROCEDURE
[Large Joint Injection] : Large joint injection [Left] : of the left [Knee] : knee [___ cc    1%] : Lidocaine ~Vcc of 1%  [___ cc    4mg] : Dexamethasone (Decadron) ~Vcc of 4 mg  [Risks, benefits, alternatives discussed / Verbal consent obtained] : the risks benefits, and alternatives have been discussed, and verbal consent was obtained

## 2023-02-28 NOTE — DISCUSSION/SUMMARY
[de-identified] : Today I recommend a cortisone injection for the left knee.  The patient agreed.  The left knee was injected with 2 cc lidocaine, 1 cc dexamethasone, procedure note generated.  If he fails to improve from a cortisone injection, I would recommend viscous injections.  He will also start home therapy exercises for the left knee, he was given a printout from the [employer] for a conditioning program.  I will see him back in 6 weeks for further evaluation.\par \par Supervising Physician: Dr. Arriola

## 2023-03-06 ENCOUNTER — APPOINTMENT (OUTPATIENT)
Dept: ORTHOPEDIC SURGERY | Facility: CLINIC | Age: 58
End: 2023-03-06
Payer: MEDICAID

## 2023-03-06 VITALS — BODY MASS INDEX: 25.49 KG/M2 | HEIGHT: 75 IN | WEIGHT: 205 LBS

## 2023-03-06 PROCEDURE — 99213 OFFICE O/P EST LOW 20 MIN: CPT

## 2023-03-06 NOTE — HISTORY OF PRESENT ILLNESS
[de-identified] : 57-year-old male comes in today for evaluation subsequent encounter of his left knee pain.  Patient had an injury while exercising in January.  He took the nabumetone anti-inflammatory with no relief.  Gustavo saw him last week he had a cortisone injection still having pain despite the injection.  Been doing some home exercises.

## 2023-03-06 NOTE — ASSESSMENT
[FreeTextEntry1] : At this time I explained to the patient that he does have a fair amount of arthritis in the knee specifically laterally where he has his pain.  We will put in for an MRI of the left knee to rule out any acute cause.  Follow-up after MRI is completed.\par \par This patient was seen under the supervision of Dr. Fairbanks.\par

## 2023-03-06 NOTE — IMAGING
[de-identified] : On examination of the left knee mild swelling, no ecchymosis, no erythema.  Skin is intact.  She is able to straight leg raise.  He has slight restriction through terminal knee flexion.  He has tenderness on the lateral knee lateral joint line into the knee.  Calf is soft.  No tenderness along the medial joint line.  Crepitus is noted through range of motion.  Pain with Aby.\par \par Prior x-ray was reviewed of the left knee with arthritic change, moderate in lateral compartment

## 2023-03-13 ENCOUNTER — APPOINTMENT (OUTPATIENT)
Dept: MRI IMAGING | Facility: CLINIC | Age: 58
End: 2023-03-13
Payer: MEDICAID

## 2023-03-13 PROCEDURE — 73721 MRI JNT OF LWR EXTRE W/O DYE: CPT | Mod: LT

## 2023-03-29 ENCOUNTER — APPOINTMENT (OUTPATIENT)
Dept: ORTHOPEDIC SURGERY | Facility: CLINIC | Age: 58
End: 2023-03-29

## 2023-05-01 ENCOUNTER — APPOINTMENT (OUTPATIENT)
Dept: ORTHOPEDIC SURGERY | Facility: CLINIC | Age: 58
End: 2023-05-01
Payer: MEDICAID

## 2023-05-01 DIAGNOSIS — M17.12 UNILATERAL PRIMARY OSTEOARTHRITIS, LEFT KNEE: ICD-10-CM

## 2023-05-01 PROCEDURE — 99213 OFFICE O/P EST LOW 20 MIN: CPT

## 2023-05-01 NOTE — DISCUSSION/SUMMARY
[de-identified] : I reviewed the MRI findings with the patient.  He has bone-on-bone osteoarthritis of the lateral compartment of the left knee.  I recommend viscous injections since he has had a cortisone injection and oral anti-inflammatory medication with no relief.  Please send authorization for Synvisc series injections for the left knee.  Once the medication arrives, we will call the patient to schedule the appointments to administer the injections.\par \par Supervising Physician: Dr. Aguilar

## 2023-05-01 NOTE — IMAGING
[de-identified] : Physical exam of the left knee: No effusion, no erythema, no ecchymosis.  Full extension, flexion to 120 degrees.  Tenderness to palpation over the lateral joint line.  No tenderness to palpation over the medial joint line.  No tenderness to palpation over the anterior aspect of the left knee.  None antalgic gait, intact to light touch and sensation.

## 2023-05-01 NOTE — HISTORY OF PRESENT ILLNESS
[de-identified] : Patient is a 58-year-old male here for a subsequent reevaluation of his left knee.  He has severe osteoarthritis in the lateral compartment of the left knee.  MRI showed severe lateral compartment osteoarthritis with degenerative lateral meniscus tear.  He points laterally as to where the pain is.

## 2023-05-09 ENCOUNTER — APPOINTMENT (OUTPATIENT)
Dept: NEUROLOGY | Facility: CLINIC | Age: 58
End: 2023-05-09
Payer: MEDICAID

## 2023-05-09 ENCOUNTER — APPOINTMENT (OUTPATIENT)
Dept: NEUROLOGY | Facility: CLINIC | Age: 58
End: 2023-05-09

## 2023-05-09 ENCOUNTER — OUTPATIENT (OUTPATIENT)
Dept: OUTPATIENT SERVICES | Facility: HOSPITAL | Age: 58
LOS: 1 days | End: 2023-05-09
Payer: MEDICAID

## 2023-05-09 VITALS
WEIGHT: 198 LBS | OXYGEN SATURATION: 96 % | BODY MASS INDEX: 24.62 KG/M2 | HEART RATE: 72 BPM | SYSTOLIC BLOOD PRESSURE: 130 MMHG | DIASTOLIC BLOOD PRESSURE: 86 MMHG | HEIGHT: 75 IN | TEMPERATURE: 98 F

## 2023-05-09 DIAGNOSIS — Z87.19 PERSONAL HISTORY OF OTHER DISEASES OF THE DIGESTIVE SYSTEM: Chronic | ICD-10-CM

## 2023-05-09 DIAGNOSIS — Z00.00 ENCOUNTER FOR GENERAL ADULT MEDICAL EXAMINATION WITHOUT ABNORMAL FINDINGS: ICD-10-CM

## 2023-05-09 PROCEDURE — 99213 OFFICE O/P EST LOW 20 MIN: CPT

## 2023-05-09 RX ORDER — ALPHA LIPOIC ACID 600 MG
600 TABLET ORAL DAILY
Qty: 30 | Refills: 2 | Status: COMPLETED | COMMUNITY
Start: 2023-01-23 | End: 2023-05-09

## 2023-05-09 NOTE — END OF VISIT
[] : Resident [FreeTextEntry3] : mild sensory neuropathy, stable\par check some more labs for reversible causees\par f/u 6 months

## 2023-05-09 NOTE — PHYSICAL EXAM
[FreeTextEntry1] : MS: Awake, alert, oriented to person, place, situation and time. Follows commands. \par \par Language: Speech is clear, fluent. No dysarthria. \par \par CNs 2 - 12 intact. EOMI no nystagmus, no diplopia. No facial asymmetry b/l. Tongue midline, normal movements, no atrophy. (The Lt eyelid droop but since childhood) \par \par Motor: Normal muscle bulk & tone. No noticeable tremor or seizure. No pronator drift. Muscle strength of b/l UE and b/l LE 5/5. \par \par Sensation: pinprick sensation decrease on the Lt medial two toes, temperature decrease on both feet up to the ankles, vibration sensation mildly decreased on both feet \par \par Cortical: No extinction\par \par Coordination: Intact rapid-alternating movements. No dysmetria to finger to nose. \par \par DTR: 2+ in biceps, brachioradialis and triceps; 2+ in patellar and ankle; plantars are down b/l\par \par Gait: No postural instability. Normal stance and tandem gait.\par \par \par

## 2023-05-09 NOTE — HISTORY OF PRESENT ILLNESS
[FreeTextEntry1] : Mr. Herrera is here today to follow on his numbness in his feet. He states there was no change in the situation. He tried the lipoic acid for about 30 days, with no clear benefit. \par No recent change in his weight, He states that the sensation, does not prevent him sleep, and does not affect his daily life activities. He described some burning sensation but not pain, and he states he does not think he is at the point to need a symptomatic treatment \par He denied dry mouth, dry eyes, no GI issues \par \par From the last visit 11/15/2022: \par 58 yo M w/o any PMH p/w tingling and burning pain in his both soles x 2-3 y which consistent w mild sensory polyneuropathy. He denies EtOH, drugs, heavy metal exposure, recent A1C, CBC, CMP was normal. Today's neuro exam was remarkable for brisk DTR, decreased sensation for light touch, vibration on his b/l soles, additionally he complains for lightheadedness which most likely related to dehydration. The etiology of his sensory polyneuropathy unclear but routine lab work is warranted. \par

## 2023-05-09 NOTE — ASSESSMENT
[FreeTextEntry1] : This 58y old male with no PMH presenting with sings of sensory neuropathy. Symptoms are mild, with no progression since last visit. Vit B1, B12, B6 , and TSH were normal \par \par Plan:\par - Vit E, RPR, Copper\par - Immunofixation\par - UPEP, SPEP\par - F/U 6 months \par

## 2023-05-10 DIAGNOSIS — G62.9 POLYNEUROPATHY, UNSPECIFIED: ICD-10-CM

## 2023-05-12 ENCOUNTER — APPOINTMENT (OUTPATIENT)
Dept: INTERNAL MEDICINE | Facility: CLINIC | Age: 58
End: 2023-05-12

## 2023-05-12 ENCOUNTER — OUTPATIENT (OUTPATIENT)
Dept: OUTPATIENT SERVICES | Facility: HOSPITAL | Age: 58
LOS: 1 days | End: 2023-05-12
Payer: MEDICAID

## 2023-05-12 ENCOUNTER — APPOINTMENT (OUTPATIENT)
Dept: INTERNAL MEDICINE | Facility: CLINIC | Age: 58
End: 2023-05-12
Payer: MEDICAID

## 2023-05-12 VITALS — WEIGHT: 198 LBS | HEIGHT: 75 IN | BODY MASS INDEX: 24.62 KG/M2

## 2023-05-12 VITALS
HEART RATE: 76 BPM | WEIGHT: 198 LBS | BODY MASS INDEX: 24.62 KG/M2 | SYSTOLIC BLOOD PRESSURE: 121 MMHG | HEIGHT: 75 IN | DIASTOLIC BLOOD PRESSURE: 87 MMHG

## 2023-05-12 DIAGNOSIS — Z00.00 ENCOUNTER FOR GENERAL ADULT MEDICAL EXAMINATION W/OUT ABNORMAL FINDINGS: ICD-10-CM

## 2023-05-12 DIAGNOSIS — G62.9 POLYNEUROPATHY, UNSPECIFIED: ICD-10-CM

## 2023-05-12 DIAGNOSIS — Z00.00 ENCOUNTER FOR GENERAL ADULT MEDICAL EXAMINATION WITHOUT ABNORMAL FINDINGS: ICD-10-CM

## 2023-05-12 DIAGNOSIS — Z87.19 PERSONAL HISTORY OF OTHER DISEASES OF THE DIGESTIVE SYSTEM: Chronic | ICD-10-CM

## 2023-05-12 PROCEDURE — 99213 OFFICE O/P EST LOW 20 MIN: CPT | Mod: GC

## 2023-05-12 PROCEDURE — 99213 OFFICE O/P EST LOW 20 MIN: CPT

## 2023-05-12 NOTE — HISTORY OF PRESENT ILLNESS
[FreeTextEntry1] : Follow up  [de-identified] : 58 year old M with PMHx peripheral neuropathy presenting to the clinic for routine follow-up. The patient  notes his only complaint is bilateral numbness of the feet. The patient notes this is ongoing for 2 years. Denies smoking, drinking,  or drug use.

## 2023-05-12 NOTE — PLAN
[FreeTextEntry1] : 58 year old M with PMHx peripheral neuropathy presenting to the clinic for routine follow-up.\par \par # Peripheral neuropathy\par - loss of sensation on b/l plantar aspect of his feet with numbness x 2 year, worsened since last visit \par - A1c 5.1, B12/B1/B6 wnl\par - Vit D 25\par - repeat blood work up ordered \par - neurology follow-up \par \par # Arthritis\par - L knee an b/l wrist\par - better w/ activity \par \par # Vit D deficiency, 25\par - c/w Vit D supplementation \par \par # DLD\par - , LDL 87, chol 156, HDL 32\par - repeat Lipid panel \par \par # Gastric ulcer (non-H.pylori related)\par - avoid NSAIDs\par \par # HCM\par - nLab work today \par - colonoscopy screening normal, repeat 2031\par - covid vaccine up to date\par - RTC 6 month or PRN\par

## 2023-05-16 DIAGNOSIS — G62.9 POLYNEUROPATHY, UNSPECIFIED: ICD-10-CM

## 2023-05-16 DIAGNOSIS — Z00.00 ENCOUNTER FOR GENERAL ADULT MEDICAL EXAMINATION WITHOUT ABNORMAL FINDINGS: ICD-10-CM

## 2023-05-22 NOTE — ED PROVIDER NOTE - PATIENT PORTAL LINK FT
You can access the FollowMyHealth Patient Portal offered by [facility] by registering at the following website: [email or web address]. By joining [email or web address]’s FollowMyHealth portal, you will also be able to view your health information using other applications (apps) compatible with our system. JAVI GEE: PT evaluated by intake physician. HPI/PE/ROS as noted above. Will follow up plan per intake physician   pt made aware of results and all incidental findings. given copy of reports. instructed on follow up and strict return precautions. pt verbalizes understanding and given opportunity to ask further questions

## 2023-06-14 ENCOUNTER — APPOINTMENT (OUTPATIENT)
Dept: ORTHOPEDIC SURGERY | Facility: CLINIC | Age: 58
End: 2023-06-14

## 2023-06-21 ENCOUNTER — APPOINTMENT (OUTPATIENT)
Dept: ORTHOPEDIC SURGERY | Facility: CLINIC | Age: 58
End: 2023-06-21

## 2023-06-28 ENCOUNTER — APPOINTMENT (OUTPATIENT)
Dept: ORTHOPEDIC SURGERY | Facility: CLINIC | Age: 58
End: 2023-06-28

## 2023-09-01 ENCOUNTER — APPOINTMENT (OUTPATIENT)
Dept: INTERNAL MEDICINE | Facility: CLINIC | Age: 58
End: 2023-09-01

## 2023-12-12 ENCOUNTER — APPOINTMENT (OUTPATIENT)
Dept: NEUROLOGY | Facility: CLINIC | Age: 58
End: 2023-12-12

## 2024-11-19 ENCOUNTER — EMERGENCY (EMERGENCY)
Facility: HOSPITAL | Age: 59
LOS: 0 days | Discharge: HOME | End: 2024-11-19
Attending: STUDENT IN AN ORGANIZED HEALTH CARE EDUCATION/TRAINING PROGRAM
Payer: SELF-PAY

## 2024-11-19 VITALS
OXYGEN SATURATION: 98 % | DIASTOLIC BLOOD PRESSURE: 83 MMHG | RESPIRATION RATE: 18 BRPM | HEART RATE: 72 BPM | SYSTOLIC BLOOD PRESSURE: 150 MMHG

## 2024-11-19 VITALS
WEIGHT: 209 LBS | SYSTOLIC BLOOD PRESSURE: 160 MMHG | TEMPERATURE: 98 F | HEART RATE: 58 BPM | HEIGHT: 74 IN | RESPIRATION RATE: 18 BRPM | OXYGEN SATURATION: 98 % | DIASTOLIC BLOOD PRESSURE: 96 MMHG

## 2024-11-19 DIAGNOSIS — Z87.891 PERSONAL HISTORY OF NICOTINE DEPENDENCE: ICD-10-CM

## 2024-11-19 DIAGNOSIS — R42 DIZZINESS AND GIDDINESS: ICD-10-CM

## 2024-11-19 DIAGNOSIS — J06.9 ACUTE UPPER RESPIRATORY INFECTION, UNSPECIFIED: ICD-10-CM

## 2024-11-19 DIAGNOSIS — Z87.19 PERSONAL HISTORY OF OTHER DISEASES OF THE DIGESTIVE SYSTEM: Chronic | ICD-10-CM

## 2024-11-19 DIAGNOSIS — H53.8 OTHER VISUAL DISTURBANCES: ICD-10-CM

## 2024-11-19 DIAGNOSIS — R11.10 VOMITING, UNSPECIFIED: ICD-10-CM

## 2024-11-19 DIAGNOSIS — Z91.041 RADIOGRAPHIC DYE ALLERGY STATUS: ICD-10-CM

## 2024-11-19 DIAGNOSIS — R26.89 OTHER ABNORMALITIES OF GAIT AND MOBILITY: ICD-10-CM

## 2024-11-19 DIAGNOSIS — R00.1 BRADYCARDIA, UNSPECIFIED: ICD-10-CM

## 2024-11-19 DIAGNOSIS — J45.909 UNSPECIFIED ASTHMA, UNCOMPLICATED: ICD-10-CM

## 2024-11-19 LAB
ALBUMIN SERPL ELPH-MCNC: 5 G/DL — SIGNIFICANT CHANGE UP (ref 3.5–5.2)
ALP SERPL-CCNC: 114 U/L — SIGNIFICANT CHANGE UP (ref 30–115)
ALT FLD-CCNC: 31 U/L — SIGNIFICANT CHANGE UP (ref 0–41)
ANION GAP SERPL CALC-SCNC: 13 MMOL/L — SIGNIFICANT CHANGE UP (ref 7–14)
APTT BLD: 24.1 SEC — LOW (ref 27–39.2)
AST SERPL-CCNC: 29 U/L — SIGNIFICANT CHANGE UP (ref 0–41)
BASOPHILS # BLD AUTO: 0.03 K/UL — SIGNIFICANT CHANGE UP (ref 0–0.2)
BASOPHILS NFR BLD AUTO: 0.5 % — SIGNIFICANT CHANGE UP (ref 0–1)
BILIRUB SERPL-MCNC: 1.7 MG/DL — HIGH (ref 0.2–1.2)
BUN SERPL-MCNC: 13 MG/DL — SIGNIFICANT CHANGE UP (ref 10–20)
CALCIUM SERPL-MCNC: 9.8 MG/DL — SIGNIFICANT CHANGE UP (ref 8.4–10.5)
CHLORIDE SERPL-SCNC: 101 MMOL/L — SIGNIFICANT CHANGE UP (ref 98–110)
CO2 SERPL-SCNC: 24 MMOL/L — SIGNIFICANT CHANGE UP (ref 17–32)
CREAT SERPL-MCNC: 1.1 MG/DL — SIGNIFICANT CHANGE UP (ref 0.7–1.5)
EGFR: 76 ML/MIN/1.73M2 — SIGNIFICANT CHANGE UP
EOSINOPHIL # BLD AUTO: 0.14 K/UL — SIGNIFICANT CHANGE UP (ref 0–0.7)
EOSINOPHIL NFR BLD AUTO: 2.5 % — SIGNIFICANT CHANGE UP (ref 0–8)
GLUCOSE SERPL-MCNC: 122 MG/DL — HIGH (ref 70–99)
HCT VFR BLD CALC: 41.8 % — LOW (ref 42–52)
HGB BLD-MCNC: 14.3 G/DL — SIGNIFICANT CHANGE UP (ref 14–18)
IMM GRANULOCYTES NFR BLD AUTO: 0.5 % — HIGH (ref 0.1–0.3)
INR BLD: 0.87 RATIO — SIGNIFICANT CHANGE UP (ref 0.65–1.3)
LYMPHOCYTES # BLD AUTO: 2.83 K/UL — SIGNIFICANT CHANGE UP (ref 1.2–3.4)
LYMPHOCYTES # BLD AUTO: 51.2 % — HIGH (ref 20.5–51.1)
MCHC RBC-ENTMCNC: 30.5 PG — SIGNIFICANT CHANGE UP (ref 27–31)
MCHC RBC-ENTMCNC: 34.2 G/DL — SIGNIFICANT CHANGE UP (ref 32–37)
MCV RBC AUTO: 89.1 FL — SIGNIFICANT CHANGE UP (ref 80–94)
MONOCYTES # BLD AUTO: 0.58 K/UL — SIGNIFICANT CHANGE UP (ref 0.1–0.6)
MONOCYTES NFR BLD AUTO: 10.5 % — HIGH (ref 1.7–9.3)
NEUTROPHILS # BLD AUTO: 1.92 K/UL — SIGNIFICANT CHANGE UP (ref 1.4–6.5)
NEUTROPHILS NFR BLD AUTO: 34.8 % — LOW (ref 42.2–75.2)
NRBC # BLD: 0 /100 WBCS — SIGNIFICANT CHANGE UP (ref 0–0)
PLATELET # BLD AUTO: 279 K/UL — SIGNIFICANT CHANGE UP (ref 130–400)
PMV BLD: 9.5 FL — SIGNIFICANT CHANGE UP (ref 7.4–10.4)
POTASSIUM SERPL-MCNC: 4.2 MMOL/L — SIGNIFICANT CHANGE UP (ref 3.5–5)
POTASSIUM SERPL-SCNC: 4.2 MMOL/L — SIGNIFICANT CHANGE UP (ref 3.5–5)
PROT SERPL-MCNC: 8.8 G/DL — HIGH (ref 6–8)
PROTHROM AB SERPL-ACNC: 10.2 SEC — SIGNIFICANT CHANGE UP (ref 9.95–12.87)
RBC # BLD: 4.69 M/UL — LOW (ref 4.7–6.1)
RBC # FLD: 11.9 % — SIGNIFICANT CHANGE UP (ref 11.5–14.5)
SODIUM SERPL-SCNC: 138 MMOL/L — SIGNIFICANT CHANGE UP (ref 135–146)
TROPONIN T, HIGH SENSITIVITY RESULT: <6 NG/L — SIGNIFICANT CHANGE UP (ref 6–21)
WBC # BLD: 5.53 K/UL — SIGNIFICANT CHANGE UP (ref 4.8–10.8)
WBC # FLD AUTO: 5.53 K/UL — SIGNIFICANT CHANGE UP (ref 4.8–10.8)

## 2024-11-19 PROCEDURE — 93010 ELECTROCARDIOGRAM REPORT: CPT

## 2024-11-19 PROCEDURE — 0042T: CPT | Mod: MC

## 2024-11-19 PROCEDURE — 70496 CT ANGIOGRAPHY HEAD: CPT | Mod: 26,MC

## 2024-11-19 PROCEDURE — 70496 CT ANGIOGRAPHY HEAD: CPT | Mod: MC

## 2024-11-19 PROCEDURE — 70551 MRI BRAIN STEM W/O DYE: CPT | Mod: MC

## 2024-11-19 PROCEDURE — 85025 COMPLETE CBC W/AUTO DIFF WBC: CPT

## 2024-11-19 PROCEDURE — 70450 CT HEAD/BRAIN W/O DYE: CPT | Mod: 26,MC

## 2024-11-19 PROCEDURE — 85730 THROMBOPLASTIN TIME PARTIAL: CPT

## 2024-11-19 PROCEDURE — 70450 CT HEAD/BRAIN W/O DYE: CPT | Mod: MC

## 2024-11-19 PROCEDURE — 93005 ELECTROCARDIOGRAM TRACING: CPT

## 2024-11-19 PROCEDURE — 99053 MED SERV 10PM-8AM 24 HR FAC: CPT

## 2024-11-19 PROCEDURE — 80053 COMPREHEN METABOLIC PANEL: CPT

## 2024-11-19 PROCEDURE — 99291 CRITICAL CARE FIRST HOUR: CPT | Mod: 25

## 2024-11-19 PROCEDURE — 85610 PROTHROMBIN TIME: CPT

## 2024-11-19 PROCEDURE — 71045 X-RAY EXAM CHEST 1 VIEW: CPT | Mod: 26

## 2024-11-19 PROCEDURE — 70498 CT ANGIOGRAPHY NECK: CPT | Mod: MC

## 2024-11-19 PROCEDURE — 71045 X-RAY EXAM CHEST 1 VIEW: CPT

## 2024-11-19 PROCEDURE — 70498 CT ANGIOGRAPHY NECK: CPT | Mod: 26,MC

## 2024-11-19 PROCEDURE — 36415 COLL VENOUS BLD VENIPUNCTURE: CPT

## 2024-11-19 PROCEDURE — 82962 GLUCOSE BLOOD TEST: CPT

## 2024-11-19 PROCEDURE — 70551 MRI BRAIN STEM W/O DYE: CPT | Mod: 26,MC

## 2024-11-19 PROCEDURE — G0378: CPT

## 2024-11-19 PROCEDURE — 99291 CRITICAL CARE FIRST HOUR: CPT

## 2024-11-19 PROCEDURE — 84484 ASSAY OF TROPONIN QUANT: CPT

## 2024-11-19 RX ORDER — MECLIZINE HCL 25 MG
25 TABLET ORAL ONCE
Refills: 0 | Status: COMPLETED | OUTPATIENT
Start: 2024-11-19 | End: 2024-11-19

## 2024-11-19 RX ADMIN — Medication 25 MILLIGRAM(S): at 07:54

## 2024-11-19 NOTE — ED CDU PROVIDER INITIAL DAY NOTE - DIFFERENTIAL DIAGNOSIS
differential dx includes but is not limited to:  ich, ischemic stroke, uri, pna, arrhythmia Differential Diagnosis

## 2024-11-19 NOTE — ED CDU PROVIDER INITIAL DAY NOTE - CLINICAL SUMMARY MEDICAL DECISION MAKING FREE TEXT BOX
Received pt in obs. Pt initially presented as stroke code. Pt says he has been having flu like sx for past 1 wk and was started on azithromycin yesterday from [facility]. Sx seemed to improve with abx and tessalon perles. Last night woke up around 3am to use the bathroom and felt dizzy and off balance. He went back to sleep but sx persisted when he woke up again so came to ED. Had blurry vision in L eye as well. Labs wnl. CT head noncon and CTA head/neck perfusion negative. Ekg with sinus juan pablo, no ischemic ST/T changes. Cxr clear. Currently pt feels better and says L eye blurry vision is nearly resolved. Pending MRI. PMD Dr. Butterfield.

## 2024-11-19 NOTE — ED PROVIDER NOTE - PROGRESS NOTE DETAILS
JV: Spoke to wife who states that their kids have been sick recently and her and patient have also been sick for about a week and a half, she states that she is no longer sick but he still has had symptoms of what they think is the flu.  He has been vomiting with headaches and fevers.  She states that he was normal at baseline although today and then they went to sleep and he woke her up at about 4 AM stating that he felt really dizzy and was having blurry vision.  She works in the medical field and did a stroke assessment which she states was negative and then also took his blood pressure.  When she noticed him walking he was stating that he was dizzy and almost bumped into things.  She then decided to take him to the hospital.

## 2024-11-19 NOTE — ED PROVIDER NOTE - ATTENDING CONTRIBUTION TO CARE
stroke code for L eye blurry vision and dizziness. recent flu like illness.  stroke code called in ED. pt directly to CT. neuro consulted.  NIHSS 0, VA normal OD OS OU. no double vision, no ha.    no indication for lytics 2/2 nihss 0, minor symptoms.

## 2024-11-19 NOTE — ED PROVIDER NOTE - OBJECTIVE STATEMENT
59-year-old male presents to the ED with left eye blurry vision.  Patient states that he recently has been sick and feels like he is at the flu and has been throwing up along with fever and headaches.  He says that today he woke up around 1-2 o'clock and started having left blurry vision.  He states that this is never happened before and he feels like he has hazy vision in his left eye.  Denies weakness head pain, nausea, vomiting. NIHSS: 0

## 2024-11-19 NOTE — ED CDU PROVIDER DISPOSITION NOTE - CLINICAL COURSE
Received pt in obs. Pt initially presented as stroke code. Pt says he has been having flu like sx for past 1 wk and was started on azithromycin yesterday from [facility] [place]. Sx seemed to improve with abx and tessalon perles. Last night woke up around 3am to use the bathroom and felt dizzy and off balance. He went back to sleep but sx persisted when he woke up again so came to ED. Had blurry vision in L eye as well. Labs wnl. CT head noncon and CTA head/neck perfusion negative. Ekg with sinus juna pablo, no ischemic ST/T changes. Cxr clear. Currently pt feels better and says L eye blurry vision is nearly resolved. MRI negative. Spoke with neurology who cleared pt for outpatient f/u. Pt already on azithromycin and tessalon perles. Discussed anticipatory guidance of URI/bronchitis. Strict ED return precautions given. Pt verbalized understanding and was agreeable with plan.

## 2024-11-19 NOTE — ED CDU PROVIDER DISPOSITION NOTE - NSFOLLOWUPINSTRUCTIONS_ED_ALL_ED_FT
Our Emergency Department Referral Coordinators will be reaching out to you in the next 24-48 hours from 9:00am to 5:00pm to schedule a follow up appointment. Please expect a phone call from the hospital in that time frame. If you do not receive a call or if you have any questions or concerns, you can reach them at [phone].  ----  Dizziness    Dizziness can manifest as a feeling of unsteadiness or light-headedness. You may feel like you are about to faint. This condition can be caused by a number of things, including medicines, dehydration, or illness. Drink enough fluid to keep your urine clear or pale yellow. Do not drink alcohol and limit your caffeine intake. Avoid quick or sudden movements.  Rise slowly from chairs and steady yourself until you feel okay. In the morning, first sit up on the side of the bed.    SEEK IMMEDIATE MEDICAL CARE IF YOU HAVE ANY OF THE FOLLOWING SYMPTOMS: vomiting, changes in your vision or speech, weakness in your arms or legs, trouble speaking or swallowing, chest pain, abdominal pain, shortness of breath, sweating, bleeding, headache, neck pain, or fever.    Vertigo    Vertigo is the feeling that you or your surroundings are moving when they are not. Vertigo can be dangerous if it occurs while you are doing something that could endanger you or others, such as driving.    What are the causes?    This condition is caused by a disturbance in the signals that are sent by your body’s sensory systems to your brain.     Different causes of a disturbance can lead to vertigo, including:  - Infections, especially in the inner ear.  - A bad reaction to a drug, or misuse of alcohol and medicines.  - Withdrawal from drugs or alcohol.  - Quickly changing positions, as when lying down or rolling over in bed.  - Migraine headaches.  - Decreased blood flow to the brain.  - Decreased blood pressure.  - Increased pressure in the brain from a head or neck injury, stroke, infection, tumor, or bleeding.  - Central nervous system disorders.    What are the signs or symptoms?    Symptoms of this condition usually occur when you move your head or your eyes in different directions. Symptoms may start suddenly, and they usually last for less than a minute. Symptoms may include:  - Loss of balance and falling.  - Feeling like you are spinning or moving.  - Feeling like your surroundings are spinning or moving.  - Nausea and vomiting.  - Blurred vision or double vision.  - Difficulty hearing.  - Slurred speech.  - Dizziness.  - Involuntary eye movement (nystagmus).    Symptoms can be mild and cause only slight annoyance, or they can be severe and interfere with daily life. Episodes of vertigo may return (recur) over time, and they are often triggered by certain movements. Symptoms may improve over time.    How is this diagnosed?    This condition may be diagnosed based on medical history and the quality of your nystagmus. Your health care provider may test your eye movements by asking you to quickly change positions to trigger the nystagmus. This may be called the Seagraves-Hallpike test, head thrust test, or roll test. You may be referred to a health care provider who specializes in ear, nose, and throat (ENT) problems (otolaryngologist) or a provider who specializes in disorders of the central nervous system (neurologist).    You may have additional testing, including:  - A physical exam.  - Blood tests.  - MRI.  - A CT scan.  - An electrocardiogram (ECG). This records electrical activity in your heart.  - An electroencephalogram (EEG). This records electrical activity in your brain.  - Hearing tests.    How is this treated?    Treatment for this condition depends on the cause and the severity of the symptoms. Treatment options include:    Medicines to treat nausea or vertigo. These are usually used for severe cases. Some medicines that are used to treat other conditions may also reduce or eliminate vertigo symptoms. These include:    - Medicines that control allergies (antihistamines).  - Medicines that control seizures (anticonvulsants).  - Medicines that relieve depression (antidepressants).  - Medicines that relieve anxiety (sedatives).  - Head movements to adjust your inner ear back to normal. If your vertigo is caused by an ear problem, your health care provider may recommend certain movements to correct the problem.  - Surgery. This is rare.    Follow these instructions at home:    Safety:  - Move slowly.  - Avoid sudden body or head movements.  - Avoid driving.  - Avoid operating heavy machinery.  - Avoid doing any tasks that would cause danger to you or others if you would have a vertigo episode during the task.  - If you have trouble walking or keeping your balance, try using a cane for stability. If you feel dizzy or unstable, sit down right away.  - Return to your normal activities as told by your health care provider. Ask your health care provider what activities are safe for you.    General instructions:  - Take over-the-counter and prescription medicines only as told by your health care provider.  - Avoid certain positions or movements as told by your health care provider.  - Drink enough fluid to keep your urine clear or pale yellow.  - Keep all follow-up visits as told by your health care provider. This is important.    Contact a health care provider if:  - Your medicines do not relieve your vertigo or they make it worse.  - You have a fever.  - Your condition gets worse or you develop new symptoms.  - Your family or friends notice any behavioral changes.  - Your nausea or vomiting gets worse.  - You have numbness or a “pins and needles” sensation in part of your body.    Get help right away if:  - You have difficulty moving or speaking.  - You are always dizzy.  - You faint.  - You develop severe headaches.  - You have weakness in your hands, arms, or legs.  - You have changes in your hearing or vision.  - You develop a stiff neck.  - You develop sensitivity to light.    This information is not intended to replace advice given to you by your health care provider. Make sure you discuss any questions you have with your health care provider.    Acute Bronchitis    WHAT YOU NEED TO KNOW:    What is acute bronchitis? Acute bronchitis is swelling and irritation in your lungs. It is usually caused by a virus and most often happens in the winter. Bronchitis may also be caused by bacteria or by a chemical irritant, such as smoke.    What are the signs and symptoms of acute bronchitis?  - Cough that lasts up to 3 weeks  - Runny or stuffy nose  - Hoarseness, sore throat  - Fever  - Feeling more tired than usual, and body aches  - Wheezing or pain when you breathe or cough    How is acute bronchitis treated? You may need any of the following:  Cough suppressants decrease your urge to cough.    Decongestants help loosen mucus in your lungs and make it easier to cough up. This can help you breathe easier.    Inhalers may be given. Your healthcare provider may give you one or more inhalers to help you breathe easier and cough less. An inhaler gives you medicine to open your airways. Ask your healthcare provider to show you how to use your inhaler correctly.    Antiviral medicine treats infections caused by a virus.    Antibiotics may be given if your bronchitis is caused by bacteria or if you have lung condition.    Acetaminophen decreases pain and fever. It is available without a doctor's order. Ask how much to take and how often to take it. Follow directions. Read the labels of all other medicines you are using to see if they also contain acetaminophen, or ask your doctor or pharmacist. Acetaminophen can cause liver damage if not taken correctly.    NSAIDs help decrease swelling and pain or fever. This medicine is available with or without a doctor's order. NSAIDs can cause stomach bleeding or kidney problems in certain people. If you take blood thinner medicine, always ask your healthcare provider if NSAIDs are safe for you. Always read the medicine label and follow directions.    How can I manage my symptoms?    Drink liquids as directed. You may need to drink more liquids than usual to stay hydrated. Ask how much liquid to drink each day and which liquids are best for you.    Use a cool mist humidifier. This increases air moisture in your home. This may make it easier for you to breathe and help decrease your cough.    Get more rest. Rest helps your body to heal. Slowly start to do more each day. Rest when you feel it is needed.    How can I help prevent acute bronchitis?    Ask about vaccines you may need. Get a flu vaccine each year as soon as recommended, usually in September or October. Ask your healthcare provider if you should also get a pneumonia or COVID-19 vaccine. Your healthcare provider can tell you if you should also get other vaccines, and when to get them.    Prevent the spread of germs.    Wash your hands often with soap and water. Carry germ-killing hand lotion or gel with you. You can use the lotion or gel to clean your hands when soap and water are not available.    Do not touch your eyes, nose, or mouth unless you have washed your hands first.    Always cover your mouth when you cough to prevent the spread of germs. It is best to cough into a tissue or your shirt sleeve instead of into your hand. Ask those around you to cover their mouths when they cough.    Try to avoid people who have a cold or the flu. If you are sick, stay away from others as much as possible.    Avoid irritants in the air. Avoid chemicals, fumes, and dust. Wear a face mask if you must work around dust or fumes. Stay inside on days when air pollution levels are high. If you have allergies, stay inside when pollen counts are high. Do not use aerosol products, such as spray-on deodorant, bug spray, and hair spray.    Do not smoke or be around others who are smoking. Nicotine and other chemicals in cigarettes and cigars can cause lung damage. Ask your healthcare provider for information if you currently smoke and need help to quit. E-cigarettes or smokeless tobacco still contain nicotine. Talk to your healthcare provider before you use these products.    When should I seek immediate care?  - You cough up blood.  - Your lips or fingernails turn blue.  - You feel like you are not getting enough air when you breathe.    When should I call my doctor?  - Your symptoms do not go away or get worse, even after treatment.  - Your cough does not get better within 4 weeks.  - You have questions or concerns about your condition or care.    CARE AGREEMENT:  You have the right to help plan your care. Learn about your health condition and how it may be treated. Discuss treatment options with your healthcare providers to decide what care you want to receive. You always have the right to refuse treatment.

## 2024-11-19 NOTE — ED ADULT NURSE NOTE - NSFALLRISKINTERV_ED_ALL_ED

## 2024-11-19 NOTE — STROKE CODE NOTE - NS AS PR[facility]OT[facility]ICATION [facility]
Patient ID: Tanya Stephen is a 87 y.o. female Date of Birth 7/21/1936       Chief Complaint   Patient presents with    Follow Up Wound Care Visit     Left leg wound appears nicely healed       Allergies:  Morphine and Ciprofloxacin    Diagnosis:      Diagnosis ICD-10-CM Associated Orders   1. Traumatic open wound of left lower leg, initial encounter  S81.802A Wound cleansing and dressings              Assessment & Plan:  Left lower extremity wound is resolved on exam.  Wound has epithelialized at this time.  No drainage.  Okay to discontinue wound dressing at this time and leave ERICA. Recommend to continue with routine moisturization of the skin.  No clinical s/s of infection present.   Patient is discharged from wound center today.  Discussed with patient to call for new appointment if new wound issues arise.   Patient verbalized understanding and expressed appreciation of care provided.        Subjective:   6/4/24: Patient presents to wound care center for follow-up visit of left lower extremity wound accompanied by her daughter. Nursing staff reports patient's wound is closed and is without drainage. Patient offers no wound related complaints. Denies fever, chills, increased wound drainage or wound pain.         The following portions of the patient's history were reviewed and updated as appropriate:   Patient Active Problem List   Diagnosis    Anemia    Shortness of breath    Hypomagnesemia    DM (diabetes mellitus), type 2 (HCC)    Urinary frequency    Iron deficiency anemia secondary to inadequate dietary iron intake    Sinobronchitis    Post zoster neuralgia    Primary generalized (osteo)arthritis    Seronegative arthropathy of multiple sites (HCC)    Senile osteoporosis    Lumbar spondylosis    Diabetic polyneuropathy associated with type 2 diabetes mellitus (HCC)    Diabetic gastroparesis associated with type 2 diabetes mellitus  (HCC)    Irritable bowel syndrome with diarrhea    Traumatic injury of  sacrum    Tendinitis of left rotator cuff    Abnormality of gait due to impairment of balance    Sicca syndrome (HCC)    Chest pain    Leukocytosis    Chronic diastolic heart failure (HCC)    OAB (overactive bladder)    Arterial embolism and thrombosis of lower extremity (HCC)    Toxic metabolic encephalopathy    Cellulitis    Dyslipidemia    Primary hypertension    Spinal stenosis of lumbar region with neurogenic claudication    Chronic pain syndrome    SI (sacroiliac) joint dysfunction    Elevated LFTs    Ambulatory dysfunction    Gastroesophageal reflux disease without esophagitis    Dysphagia    Raynaud's phenomenon without gangrene    Elevated liver enzymes    Incontinence of feces with fecal urgency    Parenchymal renal hypertension    Chronic kidney disease, stage IV (severe) (HCC)    Diabetic nephropathy associated with type 2 diabetes mellitus (HCC)     Past Medical History:   Diagnosis Date    Anemia     Arthritis     Back pain     CHF (congestive heart failure) (HCC)     Chronic kidney disease     Stage 3b    Confusion 02/22/2023    Diabetes (HCC)     Diabetes mellitus (HCC)     Diabetic gastroparesis associated with type 2 diabetes mellitus  (HCC)     Diabetic polyneuropathy (HCC)     Diverticulosis     Herpes zoster     Hypertension     IBS (irritable bowel syndrome)     Neurogenic claudication due to lumbar spinal stenosis     Osteoarthritis     Osteoporosis     Pulmonary edema     Raynaud's phenomenon without gangrene     Seronegative arthropathy of multiple sites (HCC)     Sicca (HCC)      Past Surgical History:   Procedure Laterality Date    BACK SURGERY      COLONOSCOPY      EGD AND COLONOSCOPY N/A 12/23/2016    Procedure: EGD AND COLONOSCOPY;  Surgeon: Elina Anderson MD;  Location: [facility];  Service:     JOINT REPLACEMENT      bilat knees and hips    OTHER SURGICAL HISTORY      pelvis rods    REPLACEMENT TOTAL KNEE BILATERAL      STOMACH SURGERY      UPPER GASTROINTESTINAL ENDOSCOPY       Family  History   Problem Relation Age of Onset    Cancer Brother     Diabetes Mother     Hypertension Father     Heart disease Father       Social History     Socioeconomic History    Marital status: /Civil Union     Spouse name: Weston    Number of children: 2    Years of education: None    Highest education level: High school graduate   Occupational History    None   Tobacco Use    Smoking status: Former     Current packs/day: 0.00     Types: Cigarettes     Quit date:      Years since quittin.4    Smokeless tobacco: Never   Vaping Use    Vaping status: Never Used   Substance and Sexual Activity    Alcohol use: No    Drug use: No    Sexual activity: Not Currently     Partners: Male     Birth control/protection: None   Other Topics Concern    None   Social History Narrative    None     Social Determinants of Health     Financial Resource Strain: Not on file   Food Insecurity: No Food Insecurity (2023)    Hunger Vital Sign     Worried About Running Out of Food in the Last Year: Never true     Ran Out of Food in the Last Year: Never true   Transportation Needs: No Transportation Needs (2023)    PRAPARE - Transportation     Lack of Transportation (Medical): No     Lack of Transportation (Non-Medical): No   Physical Activity: Not on file   Stress: Not on file   Social Connections: Not on file   Intimate Partner Violence: Not on file   Housing Stability: Low Risk  (2023)    Housing Stability Vital Sign     Unable to Pay for Housing in the Last Year: No     Number of Places Lived in the Last Year: 1     Unstable Housing in the Last Year: No        Current Outpatient Medications:     amitriptyline (ELAVIL) 10 mg tablet, TAKE TWO TABLETS BY MOUTH AT BEDTIME, Disp: 180 tablet, Rfl: 1    aspirin 81 mg chewable tablet, Chew 1 tablet (81 mg total) daily, Disp: 30 tablet, Rfl: 0    atorvastatin (LIPITOR) 10 mg tablet, TAKE ONE TABLET BY MOUTH EVERY DAY, Disp: 90 tablet, Rfl: 3    Cholecalciferol (VITAMIN  D3) 1000 units CAPS, Take 1 capsule by mouth daily , Disp: , Rfl:     dicyclomine (BENTYL) 10 mg capsule, Take 1 capsule (10 mg total) by mouth 3 (three) times a day as needed (abd pain) (Patient not taking: Reported on 11/22/2023), Disp: 30 capsule, Rfl: 2    diphenoxylate-atropine (LOMOTIL) 2.5-0.025 mg per tablet, Take 1 tablet by mouth if needed (Patient not taking: Reported on 5/10/2024), Disp: , Rfl:     diphenoxylate-atropine (LOMOTIL) 2.5-0.025 mg per tablet, Take 1 tablet by mouth 4 (four) times a day as needed for diarrhea, Disp: 30 tablet, Rfl: 0    DULoxetine (CYMBALTA) 60 mg delayed release capsule, TAKE ONE CAPSULE BY MOUTH ONCE DAILY, Disp: 30 capsule, Rfl: 5    hydroxychloroquine (PLAQUENIL) 200 mg tablet, Take 1 tablet (200 mg total) by mouth daily with breakfast, Disp: 90 tablet, Rfl: 1    Magnesium 250 MG TABS, Take 250 mg by mouth every evening, Disp: , Rfl:     metFORMIN (GLUCOPHAGE) 500 mg tablet, Take 500 mg by mouth 2 (two) times a day (Patient not taking: Reported on 5/10/2024), Disp: , Rfl:     metoprolol succinate (TOPROL-XL) 25 mg 24 hr tablet, Take 0.5 tablets (12.5 mg total) by mouth daily (Patient not taking: Reported on 6/27/2023), Disp: 45 tablet, Rfl: 3    Mirabegron ER (Myrbetriq) 50 MG TB24, Take 1 tablet by mouth daily as directed by physician., Disp: 90 tablet, Rfl: 1    nitrofurantoin (MACRODANTIN) 50 mg capsule, TAKE ONE CAPSULE BY MOUTH EVERY DAY IN THE MORNING, Disp: 30 capsule, Rfl: 6    omeprazole (PriLOSEC) 20 mg delayed release capsule, Take 20 mg by mouth daily, Disp: , Rfl:     pregabalin (LYRICA) 75 mg capsule, TAKE ONE CAPSULE BY MOUTH THREE TIMES A DAY, Disp: 90 capsule, Rfl: 5    pyridoxine (B-6) 100 MG tablet, Take 100 mg by mouth daily, Disp: , Rfl:     Sodium Fluoride (PreviDent 5000 Booster Plus) 1.1 % PSTE, Apply on teeth every evening as directed, Disp: 100 mL, Rfl: 3    torsemide (DEMADEX) 10 mg tablet, Take 1 tablet PRN for wt gain 3 lb/ 3 days or 5 lb/ 5  aria (Patient not taking: Reported on 6/27/2023), Disp: 30 tablet, Rfl: 0    Current Facility-Administered Medications:     denosumab (PROLIA) subcutaneous injection 60 mg, 60 mg, Subcutaneous, Q6 Months, , 60 mg at 05/29/24 1401    Review of Systems   Constitutional:  Negative for chills and fever.   HENT:  Negative for congestion and sneezing.    Respiratory:  Negative for cough and shortness of breath.    Cardiovascular:  Negative for leg swelling.   Musculoskeletal:  Positive for gait problem.        Walker   Skin:  Negative for wound.   Psychiatric/Behavioral:  Negative for agitation.        Objective:  /65   Pulse 87   Temp (!) 97.3 °F (36.3 °C)   Resp 18   LMP  (LMP Unknown)   Pain Score: 0-No pain     Physical Exam  Constitutional:       General: She is awake. She is not in acute distress.     Appearance: She is not ill-appearing, toxic-appearing or diaphoretic.   HENT:      Head: Normocephalic and atraumatic.      Right Ear: External ear normal.      Left Ear: External ear normal.   Eyes:      Conjunctiva/sclera: Conjunctivae normal.   Pulmonary:      Effort: Pulmonary effort is normal. No respiratory distress.   Musculoskeletal:      Left lower leg: No edema.   Skin:     General: Skin is warm and dry.      Findings: No erythema or wound.      Comments: Left lower extremity wound is epithelialized on exam and is completely healed.  No open aspect, redness, or drainage.   Neurological:      Mental Status: She is alert and oriented to person, place, and time.      Gait: Gait abnormal.   Psychiatric:         Mood and Affect: Mood normal.         Behavior: Behavior normal. Behavior is cooperative.                    Lab Results   Component Value Date    HGBA1C 6.6 (H) 03/02/2024    A1C results reviewed with the patient today.     Wound Instructions:  Orders Placed This Encounter   Procedures    Wound cleansing and dressings     Continue good skin care!  Thank you for coming to our center.      "Standing Status:   Future     Standing Expiration Date:   6/4/2024           Lakisha Judge, JOSE G, RADHA-C, CWON    Portions of the record may have been created with voice recognition software. Occasional wrong word or \"sound alike\" substitutions may have occurred due to the inherent limitations of voice recognition software. Read the chart carefully and recognize, using context, where substitutions have occurred.          Total time spent today:    Total time (face-to-face and non-face-to-face) spent on today's visit was 25 minutes. This includes preparation for the visits (i.e. reviewing test results from date recent hospitalizations, ER/Urgent Care/primary care visits and recent consultant office visits), performance of a medically appropriate history and examination, and orders for medications or testing.   " No

## 2024-11-19 NOTE — ED CDU PROVIDER DISPOSITION NOTE - CARE PROVIDER_API CALL
Jake Mukherjee  Interventional Neuroradiology  [address] [place] [address], [address]  [place] [address]  Phone: [phone]  Fax: [phone]  Follow Up Time:

## 2024-11-19 NOTE — ED CDU PROVIDER DISPOSITION NOTE - PATIENT PORTAL LINK FT
You can access the FollowMyHealth Patient Portal offered by [facility] by registering at the following website: [email or web address]. By joining [employer]’s FollowMyHealth portal, you will also be able to view your health information using other applications (apps) compatible with our system.

## 2024-11-19 NOTE — ED PROVIDER NOTE - CARE PLAN
Assessment and plan of treatment:	stroke code  labs, imaging, neuro eval   Principal Discharge DX:	Blurry vision, left eye  Assessment and plan of treatment:	stroke code  labs, imaging, neuro eval   1

## 2024-11-19 NOTE — CONSULT NOTE ADULT - ASSESSMENT
58yo man with PMH of asthma and migraines presenting as stroke code; LKW 10:30pm last night, awoke this morning 1-2am to go to bathroom, felt dizzy and unsteady on his feet and with blurred vision in L eye.  /96 and FSBG 116 on arrival.  NIHSS 0.  His blurriness is exclusively in L eye and can still see just more blurry.  Outside window for TNK and symptoms more suggestive of migraine / in the setting of current URI.    Recommendations:     58yo man with PMH of asthma and migraines presenting as stroke code; LKW 10:30pm last night, awoke this morning 1-2am to go to bathroom, felt dizzy and unsteady on his feet and with blurred vision in L eye.  /96 and FSBG 116 on arrival.  NIHSS 0.  His blurriness is exclusively in L eye and can still see just more blurry.  Outside window for TNK and symptoms more suggestive of migraine / in the setting of current URI.    Recommendations:  - obs MRI Brain non-con    Case discussed with attending Dr. Mukherjee.

## 2024-11-19 NOTE — CONSULT NOTE ADULT - SUBJECTIVE AND OBJECTIVE BOX
STROKE CODE CONSULT NOTE    58yo man with PMH of asthma and migraines presenting as stroke code.  Went to sleep at 10:30pm last night and awoke this morning 1-2am to go to bathroom, felt dizzy and unsteady on his feet and with blurred vision in L eye.  /96 and FSBG 116 on arrival.  For the past 2 weeks has had severe flu symptoms with cough and difficulty catching breath, started on azythromycin and albuterol puffs yesterday.  NIHSS 0.  His blurriness is exclusively in L eye and can still see just more blurry.  For his migraines he has been taking for years Excedrin 250mg x2 pills x3/day or more (1500mg per day or more, every day); his migraines present with assorted symptoms including sensory.    CTH:  No large acute territorial infarct. No intracranial hemorrhage.  CTP:  CTA Head & Neck:      VITAL SIGNS:  Vital Signs Last 24 Hrs  T(C): 36.6 (19 Nov 2024 05:28), Max: 36.6 (19 Nov 2024 05:28)  T(F): 97.8 (19 Nov 2024 05:28), Max: 97.8 (19 Nov 2024 05:28)  HR: 101 (19 Nov 2024 06:08) (58 - 101)  BP: 173/108 (19 Nov 2024 06:08) (160/96 - 173/108)  BP(mean): --  RR: 18 (19 Nov 2024 06:08) (18 - 18)  SpO2: 99% (19 Nov 2024 06:08) (98% - 99%)    Parameters below as of 19 Nov 2024 06:08  Patient On (Oxygen Delivery Method): room air      I&O's Summary      PHYSICAL EXAM:  Neurological Exam:   Mental status: Awake, alert and oriented x3.  Recent and remote memory intact.  No dysarthria, no aphasia.    Cranial nerves: L eye blurry vision.  Pupils equally round and reactive to light, visual fields full, no nystagmus, extraocular muscles intact, V1 through V3 intact bilaterally and symmetric, face symmetric, hearing intact to finger rub.  Motor:  No drift.  5/5 b/l UE/LE.   strength 5/5.  Normal tone and bulk.  No abnormal movements.    Sensation: Intact to light touch.  Coordination: No dysmetria on finger-to-nose and heel-to-shin.   Gait: deferred    NIHSS: 0      MEDICATIONS:  MEDICATIONS  (STANDING):    MEDICATIONS  (PRN):      ALLERGIES:  Allergies    iodine (Short breath; Rash)  Seafood (Short breath; Rash)    Intolerances        LABS:                        14.3   5.53  )-----------( 279      ( 19 Nov 2024 06:00 )             41.8               CAPILLARY BLOOD GLUCOSE  116 (19 Nov 2024 05:59)      POCT Blood Glucose.: 116 mg/dL (19 Nov 2024 05:37)      RADIOLOGY & ADDITIONAL TESTS: Reviewed.   STROKE CODE CONSULT NOTE    58yo man with PMH of asthma and migraines presenting as stroke code.  Went to sleep at 10:30pm last night and awoke this morning 1-2am to go to bathroom, felt dizzy and unsteady on his feet and with blurred vision in L eye.  /96 and FSBG 116 on arrival.  For the past 2 weeks has had severe flu symptoms with cough and difficulty catching breath, started on azythromycin and albuterol puffs yesterday.  NIHSS 0.  His blurriness is exclusively in L eye and can still see just more blurry.  For his migraines he has been taking for years Excedrin 250mg x2 pills x3/day or more (1500mg per day or more, every day); his migraines present with assorted symptoms including sensory.    CTH:  No large acute territorial infarct. No intracranial hemorrhage.  CT PERFUSION: No perfusion deficits to suggest areas of completed infarction or at risk territory.  CTA HEAD/NECK:  No large vessel occlusion, aneurysm, or vascular malformation.      VITAL SIGNS:  Vital Signs Last 24 Hrs  T(C): 36.6 (19 Nov 2024 05:28), Max: 36.6 (19 Nov 2024 05:28)  T(F): 97.8 (19 Nov 2024 05:28), Max: 97.8 (19 Nov 2024 05:28)  HR: 101 (19 Nov 2024 06:08) (58 - 101)  BP: 173/108 (19 Nov 2024 06:08) (160/96 - 173/108)  BP(mean): --  RR: 18 (19 Nov 2024 06:08) (18 - 18)  SpO2: 99% (19 Nov 2024 06:08) (98% - 99%)    Parameters below as of 19 Nov 2024 06:08  Patient On (Oxygen Delivery Method): room air      I&O's Summary      PHYSICAL EXAM:  Neurological Exam:   Mental status: Awake, alert and oriented x3.  Recent and remote memory intact.  No dysarthria, no aphasia.    Cranial nerves: L eye blurry vision.  Pupils equally round and reactive to light, visual fields full, no nystagmus, extraocular muscles intact, V1 through V3 intact bilaterally and symmetric, face symmetric, hearing intact to finger rub.  Motor:  No drift.  5/5 b/l UE/LE.   strength 5/5.  Normal tone and bulk.  No abnormal movements.    Sensation: Intact to light touch.  Coordination: No dysmetria on finger-to-nose and heel-to-shin.   Gait: deferred    NIHSS: 0      MEDICATIONS:  MEDICATIONS  (STANDING):    MEDICATIONS  (PRN):      ALLERGIES:  Allergies    iodine (Short breath; Rash)  Seafood (Short breath; Rash)    Intolerances        LABS:                        14.3   5.53  )-----------( 279      ( 19 Nov 2024 06:00 )             41.8               CAPILLARY BLOOD GLUCOSE  116 (19 Nov 2024 05:59)      POCT Blood Glucose.: 116 mg/dL (19 Nov 2024 05:37)      RADIOLOGY & ADDITIONAL TESTS: Reviewed.

## 2024-11-19 NOTE — ED CDU PROVIDER INITIAL DAY NOTE - ATTENDING CONTRIBUTION TO CARE
Received pt in obs. Pt initially presented as stroke code. Pt says he has been having flu like sx for past 1 wk and was started on azithromycin yesterday from [facility]. Sx seemed to improve with abx and tessalon perles. Last night woke up around 3am to use the bathroom and felt dizzy and off balance. He went back to sleep but sx persisted when he woke up again so came to ED. Had blurry vision in L eye as well. Labs wnl. CT head noncon and CTA head/neck perfusion negative. Ekg with sinus juan pablo, no ischemic ST/T changes. Cxr clear. Currently pt feels better and says L eye blurry vision is nearly resolved. Pending MRI. PMD Dr. Butterfield.

## 2024-11-19 NOTE — ED CDU PROVIDER DISPOSITION NOTE - ATTENDING CONTRIBUTION TO CARE
I have personally seen and examined this patient. I have fully participated in the care of this patient. I have made amendments to the documentation where appropriate and otherwise agree with the history, physical exam, and plan as documented by the OREN.    see MDM

## 2024-11-19 NOTE — ED PROVIDER NOTE - PHYSICAL EXAMINATION
VITAL SIGNS: I have reviewed nursing notes and confirm.  CONSTITUTIONAL: Well-appearing, non-toxic, in NAD  SKIN: Warm dry, normal skin turgor  HEAD: NCAT  EYES: No conjunctival injection, scleral anicteric  NEURO: Normal motor, normal sensory. CN II-XII grossly intact. Cerebellar testing normal. Normal gait. VITAL SIGNS: I have reviewed nursing notes and confirm.  CONSTITUTIONAL: Well-appearing, non-toxic, in NAD  SKIN: Warm dry, normal skin turgor  HEAD: NCAT  EYES: No conjunctival injection, scleral anicteric  NEURO: Normal motor, normal sensory. CN II-XII grossly intact. Cerebellar testing normal. Normal gait. Visual Acuity 20/20 left and right eye

## 2024-11-19 NOTE — ED CDU PROVIDER INITIAL DAY NOTE - PHYSICAL EXAMINATION
VITAL SIGNS: I have reviewed nursing notes and confirm.  CONSTITUTIONAL: Well-appearing, non-toxic, in NAD  SKIN: Warm dry, normal skin turgor  HEAD: NCAT  EYES: No conjunctival injection, scleral anicteric  NEURO: Normal motor, normal sensory. CN II-XII grossly intact. Cerebellar testing normal. Normal gait. Visual Acuity 20/20 left and right eye

## 2024-11-19 NOTE — ED ADULT TRIAGE NOTE - CHIEF COMPLAINT QUOTE
pt c/o dizziness, states he woke up around 1-2 am to use the bathroom and felt disoriented dizzy and lightheaded . per pt daughter he was bumping into things and shaky having to hold on to something to walk. pt also reports blurred vision in left eye.   fs 116

## 2024-11-19 NOTE — ED CDU PROVIDER INITIAL DAY NOTE - NS ED ATTENDING STATEMENT MOD
I have seen and examined this patient and fully participated in the care of this patient as the teaching attending.  The service was shared with the OREN.  I reviewed and verified the documentation. Attending with

## 2025-03-18 ENCOUNTER — EMERGENCY (EMERGENCY)
Facility: HOSPITAL | Age: 60
LOS: 0 days | Discharge: LEFT BEFORE DISCHARGE | End: 2025-03-18
Attending: EMERGENCY MEDICINE
Payer: MEDICAID

## 2025-03-18 VITALS
RESPIRATION RATE: 18 BRPM | HEIGHT: 75 IN | OXYGEN SATURATION: 96 % | WEIGHT: 199.96 LBS | DIASTOLIC BLOOD PRESSURE: 97 MMHG | HEART RATE: 70 BPM | TEMPERATURE: 98 F | SYSTOLIC BLOOD PRESSURE: 149 MMHG

## 2025-03-18 DIAGNOSIS — Z87.19 PERSONAL HISTORY OF OTHER DISEASES OF THE DIGESTIVE SYSTEM: Chronic | ICD-10-CM

## 2025-03-18 DIAGNOSIS — Z53.29 PROCEDURE AND TREATMENT NOT CARRIED OUT BECAUSE OF PATIENT'S DECISION FOR OTHER REASONS: ICD-10-CM

## 2025-03-18 DIAGNOSIS — R42 DIZZINESS AND GIDDINESS: ICD-10-CM

## 2025-03-18 PROCEDURE — 99281 EMR DPT VST MAYX REQ PHY/QHP: CPT

## 2025-03-18 PROCEDURE — L9991: CPT

## 2025-03-18 NOTE — ED PROVIDER NOTE - PHYSICAL EXAMINATION
VITAL SIGNS: I have reviewed nursing notes and confirm.  CONSTITUTIONAL: well-appearing, non-toxic, NAD  SKIN: Warm dry, normal skin turgor  HEAD: NCAT  EYES: EOMI, PERRL. No nystagmus  ENT: Moist mucous membranes, normal pharynx with no erythema or exudates  NECK: Supple; non tender.  CARD: RRR  RESP: clear to ausculation b/l.  No rales, rhonchi, or wheezing.  ABD: soft, non-tender, non-distended  EXT: Full ROM, no bony tenderness, no pedal edema, no calf tenderness  NEURO: 5/5 strength BLE and BUE. Normal sensory BUE and BLE. No pronator drift. CN II-XII intact. Cerebellar testing normal.   PSYCH: Cooperative, appropriate.

## 2025-03-18 NOTE — ED PROVIDER NOTE - OBJECTIVE STATEMENT
59-year-old male PMH asthma and migraines presents to the ED for evaluation of headache and left arm tingling.  States his symptoms started 4 days ago.  Also endorses elevated blood pressure readings at home.  Patient states he recently had a URI and was started on azithromycin 4 days ago for presumed pneumonia.  Patient reports since starting azithromycin he has been experiencing left-sided scalp tingling, and left upper and lower extremity paresthesias.  Patient also complaining of room spinning dizziness, worse when laying on his left side.  Patient states he had a similar episode after taking azithromycin a few months ago.  Denies syncope/near syncope, chest pain, shortness of breath, visual disturbances, palpitations, abdominal pain, nausea, vomiting, diarrhea or any urinary symptoms.  No trauma.
